# Patient Record
Sex: MALE | Race: WHITE | Employment: FULL TIME | URBAN - METROPOLITAN AREA
[De-identification: names, ages, dates, MRNs, and addresses within clinical notes are randomized per-mention and may not be internally consistent; named-entity substitution may affect disease eponyms.]

---

## 2017-01-12 ENCOUNTER — TELEPHONE (OUTPATIENT)
Dept: FAMILY MEDICINE CLINIC | Facility: CLINIC | Age: 57
End: 2017-01-12

## 2017-01-12 RX ORDER — AMLODIPINE BESYLATE 2.5 MG/1
TABLET ORAL
Qty: 30 TABLET | Refills: 2 | Status: SHIPPED | OUTPATIENT
Start: 2017-01-12 | End: 2017-04-16

## 2017-01-12 NOTE — TELEPHONE ENCOUNTER
AMLODIPINE BESYLATE 2.5MG TAB TAKE 1 TABLET 2.5MG BY MOUTH DAILY  PT [address] IN [facility]

## 2017-01-12 NOTE — TELEPHONE ENCOUNTER
Requesting Amlodipine refill    Hypertensive Medications  Protocol Criteria:  · Appointment scheduled in the past 6 months or in the next 3 months  · BMP or CMP in the past 12 months  · Creatinine result < 2  Recent Visits       Provider Department Primary

## 2017-02-15 ENCOUNTER — OFFICE VISIT (OUTPATIENT)
Dept: ALLERGY | Facility: CLINIC | Age: 57
End: 2017-02-15

## 2017-02-15 VITALS
RESPIRATION RATE: 16 BRPM | SYSTOLIC BLOOD PRESSURE: 150 MMHG | HEART RATE: 70 BPM | OXYGEN SATURATION: 95 % | WEIGHT: 225 LBS | HEIGHT: 71 IN | BODY MASS INDEX: 31.5 KG/M2 | TEMPERATURE: 99 F | DIASTOLIC BLOOD PRESSURE: 90 MMHG

## 2017-02-15 DIAGNOSIS — Z91.09 ENVIRONMENTAL ALLERGIES: ICD-10-CM

## 2017-02-15 DIAGNOSIS — L30.9 DERMATITIS: Primary | ICD-10-CM

## 2017-02-15 PROCEDURE — 99214 OFFICE O/P EST MOD 30 MIN: CPT | Performed by: ALLERGY & IMMUNOLOGY

## 2017-02-15 PROCEDURE — 99212 OFFICE O/P EST SF 10 MIN: CPT | Performed by: ALLERGY & IMMUNOLOGY

## 2017-02-15 RX ORDER — AMMONIUM LACTATE 12 G/100G
CREAM TOPICAL
Qty: 385 G | Refills: 1 | Status: SHIPPED | OUTPATIENT
Start: 2017-02-15

## 2017-02-15 NOTE — PATIENT INSTRUCTIONS
Benadryl 25-50 mg once a night at bedtime as needed for itching or if weakening during the middle of night with itching  Consider Allegra, fexofenadine 180 mg once a day during the day or night as a 24 hour antihistamine and is less likely to make you slee

## 2017-02-15 NOTE — PROGRESS NOTES
Lashay Lewis is a 60year old male. HPI:   Patient presents with: Allergies: F/u from 12/14/16 for Dermatitis, Urticaria, and Environmental Allergies. Patient reports minimal improvement.   He is not currently taking the Xyzal daily due to sedation, HISTORY:  Past Medical History   Diagnosis Date   • Lipid screening 02-     per [employer]   • Essential hypertension       History reviewed. No pertinent past surgical history.    Family History   Problem Relation Age of Onset Neck/Thyroid: neck is supple without adenopathy  Lymphatic: no abnormal cervical, supraclavicular or axillary adenopathy is noted  Respiratory: normal to inspection lungs are clear to auscultation bilaterally normal respiratory effort   Cardiovascular: r self administration of these medications. Teaching, instruction and sample was provided to the patient by myself. Teaching included  a review of potential adverse side effects as well as potential efficacy.   Patient's questions were answered in regards t

## 2017-02-20 ENCOUNTER — NURSE ONLY (OUTPATIENT)
Dept: ALLERGY | Facility: CLINIC | Age: 57
End: 2017-02-20

## 2017-02-20 DIAGNOSIS — L30.9 DERMATITIS: Primary | ICD-10-CM

## 2017-02-20 PROCEDURE — 95044 PATCH/APPLICATION TESTS: CPT | Performed by: ALLERGY & IMMUNOLOGY

## 2017-02-22 ENCOUNTER — NURSE ONLY (OUTPATIENT)
Dept: ALLERGY | Facility: CLINIC | Age: 57
End: 2017-02-22

## 2017-02-22 VITALS
HEART RATE: 89 BPM | RESPIRATION RATE: 19 BRPM | TEMPERATURE: 97 F | DIASTOLIC BLOOD PRESSURE: 86 MMHG | SYSTOLIC BLOOD PRESSURE: 132 MMHG

## 2017-02-23 ENCOUNTER — NURSE ONLY (OUTPATIENT)
Dept: ALLERGY | Facility: CLINIC | Age: 57
End: 2017-02-23

## 2017-02-23 ENCOUNTER — OFFICE VISIT (OUTPATIENT)
Dept: ALLERGY | Facility: CLINIC | Age: 57
End: 2017-02-23

## 2017-02-23 VITALS
HEIGHT: 71 IN | WEIGHT: 238 LBS | TEMPERATURE: 98 F | RESPIRATION RATE: 16 BRPM | HEART RATE: 60 BPM | SYSTOLIC BLOOD PRESSURE: 138 MMHG | DIASTOLIC BLOOD PRESSURE: 89 MMHG | BODY MASS INDEX: 33.32 KG/M2

## 2017-02-23 DIAGNOSIS — L30.9 DERMATITIS: Primary | ICD-10-CM

## 2017-02-23 DIAGNOSIS — Z91.09 ENVIRONMENTAL ALLERGIES: ICD-10-CM

## 2017-02-23 PROCEDURE — 95004 PERQ TESTS W/ALRGNC XTRCS: CPT | Performed by: ALLERGY & IMMUNOLOGY

## 2017-02-23 PROCEDURE — 99212 OFFICE O/P EST SF 10 MIN: CPT | Performed by: ALLERGY & IMMUNOLOGY

## 2017-02-23 PROCEDURE — 99214 OFFICE O/P EST MOD 30 MIN: CPT | Performed by: ALLERGY & IMMUNOLOGY

## 2017-02-23 NOTE — PROGRESS NOTES
Red Gonzalez is a 60year old male. HPI:   Patient presents with: Allergies: Pt presents for final patch test reading    Patient is a 53-year-old male who presents for follow-up with a chief complaint of allergies and dermatitis.   Patient presents patient reports rash and itching is overall improved in the interim with recent trial of Lac-Hydrin as a moisturizer and Lidex 0.05% cream as a topical steroid  Patient happy with improvement to date         HISTORY:  Past Medical History   Diagnosis Date dyspnea and wheezing    PHYSICAL EXAM:   Constitutional: responsive, no acute distress noted  Head/Face: NC/Atraumatic  Eyes/Vision: conjunctiva and lids are normal extraocular motion is intact   Ears/Audiometry: tympanic membranes are normal bilaterally h in 6 months or sooner if needed  Patient to call for refills in the interim if needed       Orders This Visit:  No orders of the defined types were placed in this encounter.        Meds This Visit:    No prescriptions requested or ordered in this encounter

## 2017-03-27 NOTE — TELEPHONE ENCOUNTER
Received refill request for:    Medication Detail      Medication Quantity Refills Last Filled      Fluocinonide 0.05 % External Cream 120 g 1 2/15/2017      Sig :  Apply twice a day to involved areas       Route:   (none)       LOV: 2/23/17      Advised 6

## 2017-04-16 RX ORDER — AMLODIPINE BESYLATE 2.5 MG/1
TABLET ORAL
Qty: 30 TABLET | Refills: 0 | Status: SHIPPED | OUTPATIENT
Start: 2017-04-16 | End: 2017-05-10

## 2017-05-10 RX ORDER — AMLODIPINE BESYLATE 2.5 MG/1
TABLET ORAL
Qty: 30 TABLET | Refills: 0 | Status: SHIPPED | OUTPATIENT
Start: 2017-05-10 | End: 2017-06-13

## 2017-06-13 RX ORDER — AMLODIPINE BESYLATE 2.5 MG/1
TABLET ORAL
Qty: 30 TABLET | Refills: 0 | Status: SHIPPED | OUTPATIENT
Start: 2017-06-13 | End: 2017-07-10

## 2017-07-10 ENCOUNTER — OFFICE VISIT (OUTPATIENT)
Dept: FAMILY MEDICINE CLINIC | Facility: CLINIC | Age: 57
End: 2017-07-10

## 2017-07-10 VITALS
WEIGHT: 238 LBS | SYSTOLIC BLOOD PRESSURE: 130 MMHG | HEART RATE: 66 BPM | HEIGHT: 71 IN | TEMPERATURE: 98 F | RESPIRATION RATE: 16 BRPM | BODY MASS INDEX: 33.32 KG/M2 | DIASTOLIC BLOOD PRESSURE: 80 MMHG

## 2017-07-10 DIAGNOSIS — I10 ESSENTIAL HYPERTENSION: Primary | ICD-10-CM

## 2017-07-10 DIAGNOSIS — Z12.11 COLON CANCER SCREENING: ICD-10-CM

## 2017-07-10 PROCEDURE — 99214 OFFICE O/P EST MOD 30 MIN: CPT | Performed by: FAMILY MEDICINE

## 2017-07-10 PROCEDURE — 99212 OFFICE O/P EST SF 10 MIN: CPT | Performed by: FAMILY MEDICINE

## 2017-07-10 RX ORDER — AMLODIPINE BESYLATE 2.5 MG/1
TABLET ORAL
Qty: 30 TABLET | Refills: 2 | Status: SHIPPED | OUTPATIENT
Start: 2017-07-10 | End: 2017-10-08

## 2017-07-10 NOTE — PATIENT INSTRUCTIONS
Monitor blood pressures and record at home. Limit salt intake. Recommend weight loss via daily exercising and consistent healthy dietary changes. Medication reviewed and renewed where needed and appropriate. To GI for colonoscopy.

## 2017-07-10 NOTE — PROGRESS NOTES
HPI:    Patient ID: Kimi Reeves is a 59year old male. 59year old CM for colonoscopy referral. Asymptomatic. Last one 10 years ago. No polyps. A few diverticulum. Hypertension   This is a chronic problem.  The current episode started more than Essential hypertension  To goal.   - AmLODIPine Besylate 2.5 MG Oral Tab; TAKE 1 TABLET(2.5 MG) BY MOUTH DAILY  Dispense: 30 tablet; Refill: 2    2.  Colon cancer screening  Referred  - [facility] - INTERNAL    No orders of the defined types were placed in this 7977

## 2017-09-20 NOTE — TELEPHONE ENCOUNTER
Received refill request for:    Medication Quantity Refill Last Filled     FLUOCINONIDE 0.05 % External Cream 120 g 2 3/27/2017    Sig :  APPLY TO THE AFFECTED AREA TWICE DAILY       LOV: 2/23/2017      Advised f/u: 6 months or sooner if needed      [place]

## 2017-10-08 DIAGNOSIS — I10 ESSENTIAL HYPERTENSION: ICD-10-CM

## 2017-10-09 RX ORDER — AMLODIPINE BESYLATE 2.5 MG/1
TABLET ORAL
Qty: 30 TABLET | Refills: 2 | Status: SHIPPED | OUTPATIENT
Start: 2017-10-09 | End: 2018-01-05

## 2017-10-09 NOTE — TELEPHONE ENCOUNTER
Hypertensive Medications  Protocol Criteria:  · Appointment scheduled in the past 6 months or in the next 3 months  · BMP or CMP in the past 12 months  · Creatinine result < 2  Recent Outpatient Visits            3 months ago Essential hypertension    [facility]

## 2017-11-07 ENCOUNTER — IMMUNIZATION (OUTPATIENT)
Dept: FAMILY MEDICINE CLINIC | Facility: CLINIC | Age: 57
End: 2017-11-07

## 2017-11-07 DIAGNOSIS — Z23 NEED FOR VACCINATION: ICD-10-CM

## 2017-11-07 PROCEDURE — 90686 IIV4 VACC NO PRSV 0.5 ML IM: CPT | Performed by: FAMILY MEDICINE

## 2017-11-07 PROCEDURE — 90471 IMMUNIZATION ADMIN: CPT | Performed by: FAMILY MEDICINE

## 2017-12-13 ENCOUNTER — OFFICE VISIT (OUTPATIENT)
Dept: GASTROENTEROLOGY | Facility: CLINIC | Age: 57
End: 2017-12-13

## 2017-12-13 ENCOUNTER — TELEPHONE (OUTPATIENT)
Dept: GASTROENTEROLOGY | Facility: CLINIC | Age: 57
End: 2017-12-13

## 2017-12-13 VITALS
BODY MASS INDEX: 33.99 KG/M2 | WEIGHT: 242.81 LBS | HEIGHT: 71 IN | HEART RATE: 75 BPM | DIASTOLIC BLOOD PRESSURE: 80 MMHG | SYSTOLIC BLOOD PRESSURE: 130 MMHG

## 2017-12-13 DIAGNOSIS — Z12.11 COLON CANCER SCREENING: Primary | ICD-10-CM

## 2017-12-13 PROCEDURE — 99212 OFFICE O/P EST SF 10 MIN: CPT | Performed by: INTERNAL MEDICINE

## 2017-12-13 PROCEDURE — 99203 OFFICE O/P NEW LOW 30 MIN: CPT | Performed by: INTERNAL MEDICINE

## 2017-12-13 NOTE — TELEPHONE ENCOUNTER
Scheduled for:  Colonoscopy [address]  Provider Name:   Date:  2/26/18  Location:  [facility]  Sedation:  Mac  Time:  0900 / Arrival 0800  Prep:Split Dose Suprep  Meds/Allergies Reconciled?:  Physician reviewed  Diagnosis with codes:  Colon Cancer Screening Z

## 2017-12-13 NOTE — PROGRESS NOTES
Cricket Ferrer is a 59year old male. HPI:   Patient presents with:  Colonoscopy Screening: Last colon was about 10 years ago and [address].  No current complaints      The patient is a 61-year-old male with history of high blood pressure who presents f External Cream Apply twice a day to involved areas Disp: 385 g Rfl: 1   triamcinolone acetonide 0.1 % External Cream Apply topically 2 (two) times daily as needed.  Disp: 453.6 g Rfl: 0   Levocetirizine Dihydrochloride (XYZAL) 5 MG Oral Tab Take 1 tablet (5

## 2018-01-05 DIAGNOSIS — I10 ESSENTIAL HYPERTENSION: ICD-10-CM

## 2018-01-08 RX ORDER — AMLODIPINE BESYLATE 2.5 MG/1
TABLET ORAL
Qty: 90 TABLET | Refills: 0 | Status: SHIPPED | OUTPATIENT
Start: 2018-01-08 | End: 2018-04-06

## 2018-01-08 NOTE — TELEPHONE ENCOUNTER
Pt came into the office to check on his refill. He is going out of town tomorrow & would like to know if it can be refilled by tomorrow.

## 2018-01-08 NOTE — TELEPHONE ENCOUNTER
Hypertensive Medications  Protocol Criteria:  · Appointment scheduled in the past 6 months or in the next 3 months  · BMP or CMP in the past 12 months  · Creatinine result < 2  Recent Outpatient Visits            3 weeks ago [facility]

## 2018-02-26 ENCOUNTER — TELEPHONE (OUTPATIENT)
Dept: FAMILY MEDICINE CLINIC | Facility: CLINIC | Age: 58
End: 2018-02-26

## 2018-02-26 ENCOUNTER — ANESTHESIA (OUTPATIENT)
Dept: ENDOSCOPY | Facility: HOSPITAL | Age: 58
End: 2018-02-26
Payer: COMMERCIAL

## 2018-02-26 ENCOUNTER — SURGERY (OUTPATIENT)
Age: 58
End: 2018-02-26

## 2018-02-26 ENCOUNTER — HOSPITAL ENCOUNTER (OUTPATIENT)
Facility: HOSPITAL | Age: 58
Setting detail: HOSPITAL OUTPATIENT SURGERY
Discharge: HOME | End: 2018-02-26
Attending: INTERNAL MEDICINE | Admitting: INTERNAL MEDICINE
Payer: COMMERCIAL

## 2018-02-26 ENCOUNTER — ANESTHESIA EVENT (OUTPATIENT)
Dept: ENDOSCOPY | Facility: HOSPITAL | Age: 58
End: 2018-02-26
Payer: COMMERCIAL

## 2018-02-26 VITALS
BODY MASS INDEX: 31.85 KG/M2 | RESPIRATION RATE: 20 BRPM | DIASTOLIC BLOOD PRESSURE: 92 MMHG | OXYGEN SATURATION: 99 % | SYSTOLIC BLOOD PRESSURE: 133 MMHG | HEART RATE: 59 BPM | WEIGHT: 225 LBS | HEIGHT: 70.5 IN

## 2018-02-26 DIAGNOSIS — Z12.11 COLON CANCER SCREENING: ICD-10-CM

## 2018-02-26 PROCEDURE — 0DBM8ZX EXCISION OF DESCENDING COLON, VIA NATURAL OR ARTIFICIAL OPENING ENDOSCOPIC, DIAGNOSTIC: ICD-10-PCS | Performed by: INTERNAL MEDICINE

## 2018-02-26 PROCEDURE — 0DBN8ZX EXCISION OF SIGMOID COLON, VIA NATURAL OR ARTIFICIAL OPENING ENDOSCOPIC, DIAGNOSTIC: ICD-10-PCS | Performed by: INTERNAL MEDICINE

## 2018-02-26 PROCEDURE — 0DBL8ZX EXCISION OF TRANSVERSE COLON, VIA NATURAL OR ARTIFICIAL OPENING ENDOSCOPIC, DIAGNOSTIC: ICD-10-PCS | Performed by: INTERNAL MEDICINE

## 2018-02-26 PROCEDURE — 45385 COLONOSCOPY W/LESION REMOVAL: CPT | Performed by: INTERNAL MEDICINE

## 2018-02-26 PROCEDURE — 45380 COLONOSCOPY AND BIOPSY: CPT | Performed by: INTERNAL MEDICINE

## 2018-02-26 RX ORDER — LIDOCAINE HYDROCHLORIDE 10 MG/ML
INJECTION, SOLUTION EPIDURAL; INFILTRATION; INTRACAUDAL; PERINEURAL AS NEEDED
Status: DISCONTINUED | OUTPATIENT
Start: 2018-02-26 | End: 2018-02-26 | Stop reason: SURG

## 2018-02-26 RX ORDER — SODIUM CHLORIDE, SODIUM LACTATE, POTASSIUM CHLORIDE, CALCIUM CHLORIDE 600; 310; 30; 20 MG/100ML; MG/100ML; MG/100ML; MG/100ML
INJECTION, SOLUTION INTRAVENOUS CONTINUOUS
Status: DISCONTINUED | OUTPATIENT
Start: 2018-02-26 | End: 2018-02-26

## 2018-02-26 RX ORDER — ONDANSETRON 2 MG/ML
4 INJECTION INTRAMUSCULAR; INTRAVENOUS ONCE AS NEEDED
Status: DISCONTINUED | OUTPATIENT
Start: 2018-02-26 | End: 2018-02-26

## 2018-02-26 RX ORDER — NALOXONE HYDROCHLORIDE 0.4 MG/ML
80 INJECTION, SOLUTION INTRAMUSCULAR; INTRAVENOUS; SUBCUTANEOUS AS NEEDED
Status: DISCONTINUED | OUTPATIENT
Start: 2018-02-26 | End: 2018-02-26

## 2018-02-26 RX ORDER — MORPHINE SULFATE 4 MG/ML
4 INJECTION, SOLUTION INTRAMUSCULAR; INTRAVENOUS EVERY 10 MIN PRN
Status: DISCONTINUED | OUTPATIENT
Start: 2018-02-26 | End: 2018-02-26

## 2018-02-26 RX ORDER — MORPHINE SULFATE 4 MG/ML
2 INJECTION, SOLUTION INTRAMUSCULAR; INTRAVENOUS EVERY 10 MIN PRN
Status: DISCONTINUED | OUTPATIENT
Start: 2018-02-26 | End: 2018-02-26

## 2018-02-26 RX ORDER — HYDROCODONE BITARTRATE AND ACETAMINOPHEN 5; 325 MG/1; MG/1
1 TABLET ORAL AS NEEDED
Status: DISCONTINUED | OUTPATIENT
Start: 2018-02-26 | End: 2018-02-26

## 2018-02-26 RX ORDER — HYDROCODONE BITARTRATE AND ACETAMINOPHEN 5; 325 MG/1; MG/1
2 TABLET ORAL AS NEEDED
Status: DISCONTINUED | OUTPATIENT
Start: 2018-02-26 | End: 2018-02-26

## 2018-02-26 RX ORDER — MORPHINE SULFATE 10 MG/ML
6 INJECTION, SOLUTION INTRAMUSCULAR; INTRAVENOUS EVERY 10 MIN PRN
Status: DISCONTINUED | OUTPATIENT
Start: 2018-02-26 | End: 2018-02-26

## 2018-02-26 RX ORDER — HALOPERIDOL 5 MG/ML
0.25 INJECTION INTRAMUSCULAR ONCE AS NEEDED
Status: DISCONTINUED | OUTPATIENT
Start: 2018-02-26 | End: 2018-02-26

## 2018-02-26 RX ADMIN — SODIUM CHLORIDE, SODIUM LACTATE, POTASSIUM CHLORIDE, CALCIUM CHLORIDE: 600; 310; 30; 20 INJECTION, SOLUTION INTRAVENOUS at 09:40:00

## 2018-02-26 RX ADMIN — SODIUM CHLORIDE, SODIUM LACTATE, POTASSIUM CHLORIDE, CALCIUM CHLORIDE: 600; 310; 30; 20 INJECTION, SOLUTION INTRAVENOUS at 09:08:00

## 2018-02-26 RX ADMIN — LIDOCAINE HYDROCHLORIDE 50 MG: 10 INJECTION, SOLUTION EPIDURAL; INFILTRATION; INTRACAUDAL; PERINEURAL at 09:10:00

## 2018-02-26 NOTE — TELEPHONE ENCOUNTER
I contacted pt and informed that it was entered as Z12.11 colon cancer screening (preventative, not diagnostic). Pt does state that he was told polyps were found.   I informed pt to let billing know if any problems, as the reason was still preventative, th

## 2018-02-26 NOTE — ANESTHESIA PREPROCEDURE EVALUATION
Anesthesia PreOp Note    HPI:     Gallito Pereira is a 59year old male who presents for preoperative consultation requested by: Kat Amos MD    Date of Surgery: 2/26/2018    Procedure(s):  COLONOSCOPY  Indication: Colon cancer screening       H Occupational History  None on file     Social History Main Topics   Smoking status: Former Smoker     Smokeless tobacco: Never Used    Comment: quit 40 years ago    Alcohol use Yes  0.0 oz/week     Comment: 1 drink daily    Drug use: No    Sexual activ

## 2018-02-26 NOTE — TELEPHONE ENCOUNTER
Pt wife called states pt appt is at 9 today   Needs to change from diagnostic to routine  Wife requesting call back

## 2018-02-26 NOTE — OPERATIVE REPORT
[facility] HOSP - [facility] Endoscopy Report      Preoperative Diagnosis:  - colon cancer screening      Postoperative Diagnosis:  - colon polyps x 5  - diverticulosis  - internal hemorrhoids      Procedure:    Colonoscopy       Surgeon:  Beatriz Davis M.D disease  - Symptomatic treatment of hemorrhoids          Jhony Patton MD  2/26/2018  9:42 AM

## 2018-02-26 NOTE — TELEPHONE ENCOUNTER
Not sure I understand this message. I always put in colon ca screening for colonoscopies.  If I put it in again it will be the same referral to see the GI specialist.

## 2018-02-26 NOTE — H&P
History & Physical Examination    Patient Name: Kendy Todd  MRN: [id number]  [facility]: [id number]  YOB: 1960    Diagnosis: colon cancer screening    Prescriptions Prior to Admission:  AMLODIPINE BESYLATE 2.5 MG Oral Tab TAKE 1 TABLET(2.5 M Naloxone HCl (NARCAN) 0.4 MG/ML injection 80 mcg 80 mcg Intravenous PRN       Allergies: No Known Allergies    Past Medical History:   Diagnosis Date   • Essential hypertension    • High blood pressure    • Lipid screening 02-    per Shane RUGGIERO

## 2018-02-26 NOTE — TELEPHONE ENCOUNTER
This is for a screening colonoscopy. I am not sure what the wife is requesting this from Dr. Jennifer Sanchez or Dr. Venus Villarreal? Dr. Venus Villarrela performed the procedure. Forwarding to Dr. Ksenia Vidal staff for review and advice.

## 2018-02-26 NOTE — ANESTHESIA POSTPROCEDURE EVALUATION
Patient: Patrica Cannon    Procedure Summary     Date:  02/26/18 Room / Location:  [address] [place] [address] ENDOSCOPY 01 / [address] [place] [address] ENDOSCOPY    Anesthesia Start:  0908 Anesthesia Stop:      Procedure:  COLONOSCOPY (N/A ) Diagnosis:       Colon cancer screening      (Polyps, di

## 2018-02-26 NOTE — TELEPHONE ENCOUNTER
Pt's wife is calling to ask if DR darek puentes change the procedure code for his colonoscopy. He is having a routine 10 yr colonoscopy at 9 AM [facility]. Per pt's wife the diagnostic DR lou is for a diagnostic colonoscopy. pls advise.  Thank you

## 2018-02-28 ENCOUNTER — TELEPHONE (OUTPATIENT)
Dept: GASTROENTEROLOGY | Facility: CLINIC | Age: 58
End: 2018-02-28

## 2018-02-28 NOTE — TELEPHONE ENCOUNTER
Recall colon in 3 years ,2/26/21 per  and Entered thru [facility] [place].  Colon done 2/26/18,Lab letter mailed to Pt

## 2018-03-20 NOTE — TELEPHONE ENCOUNTER
Spoke to wife Harriet Alexander. She will let him know he needs an appointment to follow up with Dr. Joo Vieira. He will call us to schedule.

## 2018-03-20 NOTE — TELEPHONE ENCOUNTER
Refill requested for FLUOCINONIDE 0.05% CREAM 60GM  Will file in chart as: FLUOCINONIDE 0.05 % External Cream  APPLY TO THE AFFECTED AREA TWICE DAILY       Disp: 120 g Refills: 0    Class: Normal Start: 3/20/2018   Originally ordered: 1 year ago by Mia Baig

## 2018-03-20 NOTE — TELEPHONE ENCOUNTER
Call reviewed and noted. Lidex refilled as requested. Patient last seen in February 2017.   Recommend follow-up appointment within the next 1-2 months for his yearly

## 2018-03-26 ENCOUNTER — OFFICE VISIT (OUTPATIENT)
Dept: FAMILY MEDICINE CLINIC | Facility: CLINIC | Age: 58
End: 2018-03-26

## 2018-03-26 ENCOUNTER — PRIOR ORIGINAL RECORDS (OUTPATIENT)
Dept: OTHER | Age: 58
End: 2018-03-26

## 2018-03-26 ENCOUNTER — LAB ENCOUNTER (OUTPATIENT)
Dept: LAB | Age: 58
End: 2018-03-26
Attending: FAMILY MEDICINE
Payer: COMMERCIAL

## 2018-03-26 VITALS
WEIGHT: 235 LBS | SYSTOLIC BLOOD PRESSURE: 110 MMHG | BODY MASS INDEX: 32.9 KG/M2 | TEMPERATURE: 98 F | DIASTOLIC BLOOD PRESSURE: 70 MMHG | RESPIRATION RATE: 16 BRPM | HEIGHT: 71 IN | HEART RATE: 69 BPM

## 2018-03-26 DIAGNOSIS — Z13.29 THYROID DISORDER SCREEN: ICD-10-CM

## 2018-03-26 DIAGNOSIS — Z13.220 LIPID SCREENING: ICD-10-CM

## 2018-03-26 DIAGNOSIS — I10 ESSENTIAL HYPERTENSION: ICD-10-CM

## 2018-03-26 DIAGNOSIS — Z12.5 PROSTATE CANCER SCREENING: ICD-10-CM

## 2018-03-26 DIAGNOSIS — I49.8 BIGEMINAL RHYTHM: ICD-10-CM

## 2018-03-26 LAB
ALBUMIN SERPL BCP-MCNC: 4.2 G/DL (ref 3.5–4.8)
ALBUMIN/GLOB SERPL: 1.6 {RATIO} (ref 1–2)
ALP SERPL-CCNC: 50 U/L (ref 32–100)
ALT SERPL-CCNC: 34 U/L (ref 17–63)
ANION GAP SERPL CALC-SCNC: 8 MMOL/L (ref 0–18)
AST SERPL-CCNC: 26 U/L (ref 15–41)
BASOPHILS # BLD: 0.1 K/UL (ref 0–0.2)
BASOPHILS NFR BLD: 1 %
BILIRUB SERPL-MCNC: 1 MG/DL (ref 0.3–1.2)
BILIRUB UR QL: NEGATIVE
BUN SERPL-MCNC: 13 MG/DL (ref 8–20)
BUN/CREAT SERPL: 16.7 (ref 10–20)
CALCIUM SERPL-MCNC: 9.1 MG/DL (ref 8.5–10.5)
CHLORIDE SERPL-SCNC: 103 MMOL/L (ref 95–110)
CHOLEST SERPL-MCNC: 159 MG/DL (ref 110–200)
CLARITY UR: CLEAR
CO2 SERPL-SCNC: 27 MMOL/L (ref 22–32)
COLOR UR: YELLOW
CREAT SERPL-MCNC: 0.78 MG/DL (ref 0.5–1.5)
EOSINOPHIL # BLD: 0.2 K/UL (ref 0–0.7)
EOSINOPHIL NFR BLD: 3 %
ERYTHROCYTE [DISTWIDTH] IN BLOOD BY AUTOMATED COUNT: 14.1 % (ref 11–15)
GLOBULIN PLAS-MCNC: 2.6 G/DL (ref 2.5–3.7)
GLUCOSE SERPL-MCNC: 97 MG/DL (ref 70–99)
GLUCOSE UR-MCNC: NEGATIVE MG/DL
HCT VFR BLD AUTO: 45.9 % (ref 41–52)
HDLC SERPL-MCNC: 43 MG/DL
HGB BLD-MCNC: 15.7 G/DL (ref 13.5–17.5)
HGB UR QL STRIP.AUTO: NEGATIVE
KETONES UR-MCNC: NEGATIVE MG/DL
LDLC SERPL CALC-MCNC: 102 MG/DL (ref 0–99)
LEUKOCYTE ESTERASE UR QL STRIP.AUTO: NEGATIVE
LYMPHOCYTES # BLD: 1.4 K/UL (ref 1–4)
LYMPHOCYTES NFR BLD: 29 %
MCH RBC QN AUTO: 31.4 PG (ref 27–32)
MCHC RBC AUTO-ENTMCNC: 34.3 G/DL (ref 32–37)
MCV RBC AUTO: 91.7 FL (ref 80–100)
MONOCYTES # BLD: 0.4 K/UL (ref 0–1)
MONOCYTES NFR BLD: 8 %
NEUTROPHILS # BLD AUTO: 2.9 K/UL (ref 1.8–7.7)
NEUTROPHILS NFR BLD: 59 %
NITRITE UR QL STRIP.AUTO: NEGATIVE
NONHDLC SERPL-MCNC: 116 MG/DL
OSMOLALITY UR CALC.SUM OF ELEC: 286 MOSM/KG (ref 275–295)
PATIENT FASTING: YES
PH UR: 6 [PH] (ref 5–8)
PLATELET # BLD AUTO: 179 K/UL (ref 140–400)
PMV BLD AUTO: 8.1 FL (ref 7.4–10.3)
POTASSIUM SERPL-SCNC: 4.1 MMOL/L (ref 3.3–5.1)
PROT SERPL-MCNC: 6.8 G/DL (ref 5.9–8.4)
PROT UR-MCNC: NEGATIVE MG/DL
PSA SERPL-MCNC: 1.3 NG/ML (ref 0–4)
RBC # BLD AUTO: 5 M/UL (ref 4.5–5.9)
SODIUM SERPL-SCNC: 138 MMOL/L (ref 136–144)
SP GR UR STRIP: 1.01 (ref 1–1.03)
TRIGL SERPL-MCNC: 71 MG/DL (ref 1–149)
TSH SERPL-ACNC: 1.38 UIU/ML (ref 0.45–5.33)
UROBILINOGEN UR STRIP-ACNC: <2
VIT C UR-MCNC: NEGATIVE MG/DL
WBC # BLD AUTO: 4.9 K/UL (ref 4–11)

## 2018-03-26 PROCEDURE — 80053 COMPREHEN METABOLIC PANEL: CPT

## 2018-03-26 PROCEDURE — 36415 COLL VENOUS BLD VENIPUNCTURE: CPT

## 2018-03-26 PROCEDURE — 99213 OFFICE O/P EST LOW 20 MIN: CPT | Performed by: FAMILY MEDICINE

## 2018-03-26 PROCEDURE — 85025 COMPLETE CBC W/AUTO DIFF WBC: CPT

## 2018-03-26 PROCEDURE — 80061 LIPID PANEL: CPT

## 2018-03-26 PROCEDURE — 80050 GENERAL HEALTH PANEL: CPT

## 2018-03-26 PROCEDURE — 93005 ELECTROCARDIOGRAM TRACING: CPT | Performed by: FAMILY MEDICINE

## 2018-03-26 PROCEDURE — 99396 PREV VISIT EST AGE 40-64: CPT | Performed by: FAMILY MEDICINE

## 2018-03-26 PROCEDURE — 81003 URINALYSIS AUTO W/O SCOPE: CPT

## 2018-03-26 PROCEDURE — 93000 ELECTROCARDIOGRAM COMPLETE: CPT | Performed by: FAMILY MEDICINE

## 2018-03-26 NOTE — PROGRESS NOTES
HPI:    Patient ID: Evan العراقي is a 59year old male.     59year old  male here for complete preventive care physical and for status update on any confirmed chronic medical illnesses and follow up on any previous labs or procedures that we is oriented to person, place, and time. He appears well-developed and well-nourished. No distress.    HENT:   Right Ear: Tympanic membrane and ear canal normal.   Left Ear: Tympanic membrane and ear canal normal.   Nose: Nose normal.   Mouth/Throat: Orophar

## 2018-03-29 ENCOUNTER — HOSPITAL ENCOUNTER (OUTPATIENT)
Dept: CV DIAGNOSTICS | Facility: HOSPITAL | Age: 58
Discharge: HOME | End: 2018-03-29
Attending: FAMILY MEDICINE
Payer: COMMERCIAL

## 2018-03-29 DIAGNOSIS — I49.8 BIGEMINAL RHYTHM: ICD-10-CM

## 2018-03-29 PROCEDURE — 93227 XTRNL ECG REC<48 HR R&I: CPT | Performed by: FAMILY MEDICINE

## 2018-03-31 ENCOUNTER — HOSPITAL ENCOUNTER (OUTPATIENT)
Dept: CV DIAGNOSTICS | Facility: HOSPITAL | Age: 58
Discharge: HOME | End: 2018-03-31
Attending: FAMILY MEDICINE
Payer: COMMERCIAL

## 2018-03-31 PROCEDURE — 93225 XTRNL ECG REC<48 HRS REC: CPT | Performed by: FAMILY MEDICINE

## 2018-04-02 RX ORDER — MIDAZOLAM HYDROCHLORIDE 1 MG/ML
INJECTION INTRAMUSCULAR; INTRAVENOUS
Status: DISCONTINUED
Start: 2018-04-02 | End: 2018-04-02 | Stop reason: WASHOUT

## 2018-04-03 ENCOUNTER — TELEPHONE (OUTPATIENT)
Dept: FAMILY MEDICINE CLINIC | Facility: CLINIC | Age: 58
End: 2018-04-03

## 2018-04-03 NOTE — TELEPHONE ENCOUNTER
Spoke to Efren in [facility]. Efren stated results were received. Per Efren, results of holter monitor test need to be QC'd. Efren states results will be QC'd and sent to cardiologist for review.      Per Efren, results should be available on A

## 2018-04-03 NOTE — TELEPHONE ENCOUNTER
Spoke to Efren at [facility] and they state results are still at cardiomedics waiting to be analyzed. Results to be read today per cardiographics.  Once results are read, they will be mailed to a cardiologist at [facility].    Attempted to co

## 2018-04-04 NOTE — TELEPHONE ENCOUNTER
Patient arrived in office today asking for report from holter monitor testing. Pacheco Francis in [facility] and he stated, cardiologist has report and it needs to be reviewed.      Informed patient, that report is with cardiologist and it alli

## 2018-04-05 ENCOUNTER — PRIOR ORIGINAL RECORDS (OUTPATIENT)
Dept: OTHER | Age: 58
End: 2018-04-05

## 2018-04-05 ENCOUNTER — TELEPHONE (OUTPATIENT)
Dept: FAMILY MEDICINE CLINIC | Facility: CLINIC | Age: 58
End: 2018-04-05

## 2018-04-06 DIAGNOSIS — I10 ESSENTIAL HYPERTENSION: ICD-10-CM

## 2018-04-06 RX ORDER — AMLODIPINE BESYLATE 2.5 MG/1
TABLET ORAL
Qty: 90 TABLET | Refills: 0 | Status: SHIPPED | OUTPATIENT
Start: 2018-04-06 | End: 2018-07-09

## 2018-04-06 NOTE — TELEPHONE ENCOUNTER
Dr Lakeshia Yanes, just FYI: pt already saw a cardiologist yesterday,  Dr Itzel Reich at [facility], who is a family friend. Pt [facility] cardiologist ordered a heart scan, and will forwoard results to you.

## 2018-04-06 NOTE — TELEPHONE ENCOUNTER
Rx approved for 90 days per protocol.     Hypertensive Medications  Protocol Criteria:  · Appointment scheduled in the past 6 months or in the next 3 months  · BMP or CMP in the past 12 months  · Creatinine result < 2  Recent Outpatient Visits            1

## 2018-04-12 LAB
ALBUMIN: 4.2 G/DL
ALKALINE PHOSPHATATE(ALK PHOS): 50 IU/L
BILIRUBIN TOTAL: 1 MG/DL
BUN: 13 MG/DL
CALCIUM: 9.1 MG/DL
CHLORIDE: 103 MEQ/L
CHOLESTEROL, TOTAL: 159 MG/DL
CREATININE, SERUM: 0.78 MG/DL
GLUCOSE: 97 MG/DL
HDL CHOLESTEROL: 43 MG/DL
HEMATOCRIT: 45.9 %
HEMOGLOBIN: 15.7 G/DL
LDL CHOLESTEROL: 102 MG/DL
PLATELETS: 179 K/UL
POTASSIUM, SERUM: 4.1 MEQ/L
PROTEIN, TOTAL: 6.8 G/DL
RED BLOOD COUNT: 5 X 10-6/U
SGOT (AST): 26 IU/L
SGPT (ALT): 34 IU/L
SODIUM: 138 MEQ/L
THYROID STIMULATING HORMONE: 1.38 MLU/L
TRIGLYCERIDES: 71 MG/DL
WHITE BLOOD COUNT: 4.9 X 10-3/U

## 2018-06-25 ENCOUNTER — HOSPITAL ENCOUNTER (OUTPATIENT)
Dept: CT IMAGING | Age: 58
Discharge: HOME | End: 2018-06-25
Attending: FAMILY MEDICINE

## 2018-06-25 DIAGNOSIS — Z13.9 ENCOUNTER FOR SCREENING: ICD-10-CM

## 2018-06-25 NOTE — PROGRESS NOTES
Pt seen at [facility], [facility]:  PRELIMINARY SCORE= 290.42  BP= 110/70  Cholestec labs as follows: Labs from 3/26/18  TC= 159  HDL= 43  LDL= 102  TG= 71  GLUCOSE= 97  All results and risk factors discussed with patient; all questions and concerns addre

## 2018-07-03 ENCOUNTER — TELEPHONE (OUTPATIENT)
Dept: FAMILY MEDICINE CLINIC | Facility: CLINIC | Age: 58
End: 2018-07-03

## 2018-07-03 NOTE — TELEPHONE ENCOUNTER
----- Message from Olivia Mondragon DO sent at 7/3/2018 10:33 AM CDT -----  Patient needs cardiology following up based on cardiac risk assessment. See if patient is agreeable.  If he prefers to see me first then he can be placed on my schedule (same day

## 2018-07-05 NOTE — TELEPHONE ENCOUNTER
Patient stated he seen a cardiologist Dr. Curtis Alvarado at [facility].   Dr. Asmita Leong office number is [phone], [address], [place]    Patient stated Dr. Curtis Alvarado ordered a heart scan tulio

## 2018-07-06 NOTE — TELEPHONE ENCOUNTER
If he is following up with the cardiologist concerning his scan then he does not need to see me concerning this.

## 2018-07-09 DIAGNOSIS — I10 ESSENTIAL HYPERTENSION: ICD-10-CM

## 2018-07-09 RX ORDER — AMLODIPINE BESYLATE 2.5 MG/1
TABLET ORAL
Qty: 90 TABLET | Refills: 0 | Status: SHIPPED | OUTPATIENT
Start: 2018-07-09 | End: 2018-10-01

## 2018-07-10 NOTE — TELEPHONE ENCOUNTER
Hypertensive Medications  Protocol Criteria:  · Appointment scheduled in the past 6 months or in the next 3 months  · BMP or CMP in the past 12 months  · Creatinine result < 2  Recent Outpatient Visits            3 months ago Encounter for routine child he

## 2018-07-20 ENCOUNTER — PRIOR ORIGINAL RECORDS (OUTPATIENT)
Dept: OTHER | Age: 58
End: 2018-07-20

## 2018-07-26 ENCOUNTER — PRIOR ORIGINAL RECORDS (OUTPATIENT)
Dept: OTHER | Age: 58
End: 2018-07-26

## 2018-07-30 ENCOUNTER — HOSPITAL ENCOUNTER (OUTPATIENT)
Dept: CV DIAGNOSTICS | Facility: HOSPITAL | Age: 58
Discharge: HOME | End: 2018-07-30
Attending: INTERNAL MEDICINE
Payer: COMMERCIAL

## 2018-07-30 DIAGNOSIS — R93.1 ABNORMAL CT SCAN OF HEART: ICD-10-CM

## 2018-07-30 DIAGNOSIS — R93.1 ABNORMAL FINDINGS DIAGNOSTIC IMAGING OF HEART AND CORONARY CIRCULATION: ICD-10-CM

## 2018-07-30 DIAGNOSIS — R00.2 PALPITATIONS: ICD-10-CM

## 2018-07-30 DIAGNOSIS — Z82.49 FAMILY HISTORY OF CORONARY ARTERY DISEASE: ICD-10-CM

## 2018-07-30 PROCEDURE — 93016 CV STRESS TEST SUPVJ ONLY: CPT | Performed by: INTERNAL MEDICINE

## 2018-07-30 PROCEDURE — 93018 CV STRESS TEST I&R ONLY: CPT | Performed by: INTERNAL MEDICINE

## 2018-07-30 PROCEDURE — 93350 STRESS TTE ONLY: CPT | Performed by: INTERNAL MEDICINE

## 2018-07-30 PROCEDURE — 93017 CV STRESS TEST TRACING ONLY: CPT | Performed by: INTERNAL MEDICINE

## 2018-08-22 ENCOUNTER — PRIOR ORIGINAL RECORDS (OUTPATIENT)
Dept: OTHER | Age: 58
End: 2018-08-22

## 2018-08-22 ENCOUNTER — LAB ENCOUNTER (OUTPATIENT)
Dept: LAB | Age: 58
End: 2018-08-22
Attending: INTERNAL MEDICINE
Payer: COMMERCIAL

## 2018-08-22 DIAGNOSIS — E78.00 PURE HYPERCHOLESTEROLEMIA: Primary | ICD-10-CM

## 2018-08-22 DIAGNOSIS — I10 ESSENTIAL HYPERTENSION, MALIGNANT: ICD-10-CM

## 2018-08-22 LAB
ALT SERPL-CCNC: 26 U/L (ref 17–63)
AST SERPL-CCNC: 23 U/L (ref 15–41)
CHOLEST SERPL-MCNC: 94 MG/DL (ref 110–200)
HDLC SERPL-MCNC: 46 MG/DL
LDLC SERPL CALC-MCNC: 41 MG/DL (ref 0–99)
NONHDLC SERPL-MCNC: 48 MG/DL
TRIGL SERPL-MCNC: 35 MG/DL (ref 1–149)

## 2018-08-22 PROCEDURE — 84460 ALANINE AMINO (ALT) (SGPT): CPT

## 2018-08-22 PROCEDURE — 80061 LIPID PANEL: CPT

## 2018-08-22 PROCEDURE — 84450 TRANSFERASE (AST) (SGOT): CPT

## 2018-08-22 PROCEDURE — 36415 COLL VENOUS BLD VENIPUNCTURE: CPT

## 2018-08-27 ENCOUNTER — PRIOR ORIGINAL RECORDS (OUTPATIENT)
Dept: OTHER | Age: 58
End: 2018-08-27

## 2018-09-05 LAB
ALT (SGPT): 26 U/L
AST (SGOT): 23 U/L
CHOLESTEROL, TOTAL: 94 MG/DL
HDL CHOLESTEROL: 46 MG/DL
LDL CHOLESTEROL: 41 MG/DL
TRIGLYCERIDES: 35 MG/DL

## 2018-09-06 ENCOUNTER — PRIOR ORIGINAL RECORDS (OUTPATIENT)
Dept: OTHER | Age: 58
End: 2018-09-06

## 2018-10-01 DIAGNOSIS — I10 ESSENTIAL HYPERTENSION: ICD-10-CM

## 2018-10-01 RX ORDER — AMLODIPINE BESYLATE 2.5 MG/1
TABLET ORAL
Qty: 90 TABLET | Refills: 0 | Status: SHIPPED | OUTPATIENT
Start: 2018-10-01 | End: 2019-01-02

## 2018-10-01 NOTE — TELEPHONE ENCOUNTER
Please advise on refill request.   Protocol failed due to no recent visit.  LOV 03/26/18    Hypertensive Medications  Protocol Criteria:  · Appointment scheduled in the past 6 months or in the next 3 months  · BMP or CMP in the past 12 months  · Creatinine

## 2018-10-16 ENCOUNTER — MYAURORA ACCOUNT LINK (OUTPATIENT)
Dept: OTHER | Age: 58
End: 2018-10-16

## 2018-10-16 ENCOUNTER — PRIOR ORIGINAL RECORDS (OUTPATIENT)
Dept: OTHER | Age: 58
End: 2018-10-16

## 2018-10-24 ENCOUNTER — IMMUNIZATION (OUTPATIENT)
Dept: FAMILY MEDICINE CLINIC | Facility: CLINIC | Age: 58
End: 2018-10-24
Payer: COMMERCIAL

## 2018-10-24 DIAGNOSIS — Z23 NEED FOR VACCINATION: ICD-10-CM

## 2018-10-24 PROCEDURE — 90471 IMMUNIZATION ADMIN: CPT | Performed by: FAMILY MEDICINE

## 2018-10-24 PROCEDURE — 90686 IIV4 VACC NO PRSV 0.5 ML IM: CPT | Performed by: FAMILY MEDICINE

## 2018-12-18 ENCOUNTER — OFFICE VISIT (OUTPATIENT)
Dept: FAMILY MEDICINE CLINIC | Facility: CLINIC | Age: 58
End: 2018-12-18
Payer: COMMERCIAL

## 2018-12-18 VITALS
SYSTOLIC BLOOD PRESSURE: 110 MMHG | WEIGHT: 234 LBS | DIASTOLIC BLOOD PRESSURE: 82 MMHG | HEART RATE: 80 BPM | HEIGHT: 71 IN | RESPIRATION RATE: 17 BRPM | BODY MASS INDEX: 32.76 KG/M2

## 2018-12-18 DIAGNOSIS — G89.29 CHRONIC HAND PAIN, RIGHT: Primary | ICD-10-CM

## 2018-12-18 DIAGNOSIS — E78.5 HYPERLIPIDEMIA, UNSPECIFIED HYPERLIPIDEMIA TYPE: ICD-10-CM

## 2018-12-18 DIAGNOSIS — I10 ESSENTIAL HYPERTENSION: ICD-10-CM

## 2018-12-18 DIAGNOSIS — M79.641 CHRONIC HAND PAIN, RIGHT: Primary | ICD-10-CM

## 2018-12-18 PROCEDURE — 99212 OFFICE O/P EST SF 10 MIN: CPT | Performed by: FAMILY MEDICINE

## 2018-12-18 PROCEDURE — 99214 OFFICE O/P EST MOD 30 MIN: CPT | Performed by: FAMILY MEDICINE

## 2018-12-18 RX ORDER — ROSUVASTATIN CALCIUM 20 MG/1
20 TABLET, COATED ORAL NIGHTLY
Refills: 0 | COMMUNITY
Start: 2018-11-15 | End: 2020-08-24 | Stop reason: DRUGHIGH

## 2018-12-18 NOTE — PATIENT INSTRUCTIONS
Medication reviewed and renewed where needed and appropriate. Comply with medications. Monitor blood pressures and record at home. Limit salt intake. Encouraged physical fitness and daily physical activity daily (PLAY). May need PICA splint.   To ortho

## 2018-12-18 NOTE — PROGRESS NOTES
HPI:    Patient ID: Matthew Jeffery is a 59year old male. Hypertension   This is a chronic problem. The current episode started more than 1 year ago. The problem is unchanged. Condition status: variable control.  Pertinent negatives include no chest Cardiovascular: Normal rate and regular rhythm. Exam reveals no gallop. Edema not present. Carotid bruit not present. Pulmonary/Chest: Effort normal and breath sounds normal.   Abdominal: Soft.  Bowel sounds are normal.   Musculoskeletal:        Right

## 2019-01-01 ENCOUNTER — EXTERNAL RECORD (OUTPATIENT)
Dept: HEALTH INFORMATION MANAGEMENT | Facility: OTHER | Age: 59
End: 2019-01-01

## 2019-01-02 DIAGNOSIS — I10 ESSENTIAL HYPERTENSION: ICD-10-CM

## 2019-01-02 RX ORDER — AMLODIPINE BESYLATE 2.5 MG/1
TABLET ORAL
Qty: 90 TABLET | Refills: 0 | Status: SHIPPED | OUTPATIENT
Start: 2019-01-02 | End: 2019-04-01

## 2019-01-03 ENCOUNTER — HOSPITAL ENCOUNTER (OUTPATIENT)
Dept: GENERAL RADIOLOGY | Facility: HOSPITAL | Age: 59
Discharge: HOME | End: 2019-01-03
Attending: ORTHOPAEDIC SURGERY
Payer: COMMERCIAL

## 2019-01-03 ENCOUNTER — OFFICE VISIT (OUTPATIENT)
Dept: ORTHOPEDICS CLINIC | Facility: CLINIC | Age: 59
End: 2019-01-03
Payer: COMMERCIAL

## 2019-01-03 DIAGNOSIS — M25.531 RIGHT WRIST PAIN: Primary | ICD-10-CM

## 2019-01-03 DIAGNOSIS — M25.531 RIGHT WRIST PAIN: ICD-10-CM

## 2019-01-03 DIAGNOSIS — M18.11 ARTHRITIS OF CARPOMETACARPAL (CMC) JOINT OF RIGHT THUMB: ICD-10-CM

## 2019-01-03 PROCEDURE — L3923 HFO WITHOUT JOINTS PRE CST: HCPCS | Performed by: ORTHOPAEDIC SURGERY

## 2019-01-03 PROCEDURE — 73110 X-RAY EXAM OF WRIST: CPT | Performed by: ORTHOPAEDIC SURGERY

## 2019-01-03 PROCEDURE — 99243 OFF/OP CNSLTJ NEW/EST LOW 30: CPT | Performed by: ORTHOPAEDIC SURGERY

## 2019-01-03 PROCEDURE — 99212 OFFICE O/P EST SF 10 MIN: CPT | Performed by: ORTHOPAEDIC SURGERY

## 2019-01-03 NOTE — PROGRESS NOTES
1/3/2019  Shannon Salmeron  3/19/1960  59year old   male  Katiana Banks MD    HPI:   Patient presents with:  Hand Pain: Was referred by Dr. Edward Tracy.   C/o right hand pain when trying to open a jar, shaking hands, or even buttoning a shirt for about a oz      Comment: 1 drink daily    Drug use: No          REVIEW OF SYSTEMS:   A 12 point review of systems was performed as documented on the intake form and reviewed by me today with pertinent positives and negatives listed in the HPI.     EXAM:   There wer indications, benefits, and procedures of both operative and non-operative treatment were discussed with the patient. Non-operative treatment was selected and will include OTC medication, Epsom salt soaks, and a short opponens splint.   The patient was in

## 2019-01-09 ENCOUNTER — OFFICE VISIT (OUTPATIENT)
Dept: FAMILY MEDICINE CLINIC | Facility: CLINIC | Age: 59
End: 2019-01-09
Payer: COMMERCIAL

## 2019-01-09 VITALS
HEIGHT: 71 IN | TEMPERATURE: 98 F | WEIGHT: 225 LBS | HEART RATE: 77 BPM | BODY MASS INDEX: 31.5 KG/M2 | DIASTOLIC BLOOD PRESSURE: 95 MMHG | SYSTOLIC BLOOD PRESSURE: 139 MMHG

## 2019-01-09 DIAGNOSIS — B02.9 HERPES ZOSTER WITHOUT COMPLICATION: Primary | ICD-10-CM

## 2019-01-09 PROCEDURE — 99212 OFFICE O/P EST SF 10 MIN: CPT | Performed by: FAMILY MEDICINE

## 2019-01-09 PROCEDURE — 99213 OFFICE O/P EST LOW 20 MIN: CPT | Performed by: FAMILY MEDICINE

## 2019-01-09 RX ORDER — TRAMADOL HYDROCHLORIDE 50 MG/1
50 TABLET ORAL NIGHTLY
Qty: 15 TABLET | Refills: 0 | Status: SHIPPED | OUTPATIENT
Start: 2019-01-09 | End: 2019-08-22

## 2019-01-09 RX ORDER — VALACYCLOVIR HYDROCHLORIDE 1 G/1
1 TABLET, FILM COATED ORAL 3 TIMES DAILY
Qty: 21 TABLET | Refills: 0 | Status: SHIPPED | OUTPATIENT
Start: 2019-01-09 | End: 2019-01-16

## 2019-01-09 NOTE — PROGRESS NOTES
HPI:    Drew Mahan is a 59year old male presents to clinic with a 2 day history of an itchy rash over the right side of his lower back. Started Sunday night, patient initially thought he had a spider bite.   Over the next 2 days he noticed rash Height: 5' 11\" (1.803 m)     Physical Exam   Constitutional: No distress. Cardiovascular: Normal rate, regular rhythm and normal heart sounds. Pulmonary/Chest: Effort normal and breath sounds normal. No respiratory distress. He has no wheezes.  He ha

## 2019-02-28 VITALS
SYSTOLIC BLOOD PRESSURE: 134 MMHG | BODY MASS INDEX: 33.5 KG/M2 | HEART RATE: 77 BPM | DIASTOLIC BLOOD PRESSURE: 70 MMHG | HEIGHT: 70 IN | WEIGHT: 234 LBS

## 2019-02-28 VITALS
BODY MASS INDEX: 33.79 KG/M2 | SYSTOLIC BLOOD PRESSURE: 120 MMHG | DIASTOLIC BLOOD PRESSURE: 64 MMHG | HEART RATE: 74 BPM | WEIGHT: 236 LBS | HEIGHT: 70 IN

## 2019-03-28 ENCOUNTER — OFFICE VISIT (OUTPATIENT)
Dept: FAMILY MEDICINE CLINIC | Facility: CLINIC | Age: 59
End: 2019-03-28
Payer: COMMERCIAL

## 2019-03-28 VITALS
SYSTOLIC BLOOD PRESSURE: 154 MMHG | HEART RATE: 74 BPM | BODY MASS INDEX: 33.32 KG/M2 | DIASTOLIC BLOOD PRESSURE: 106 MMHG | RESPIRATION RATE: 17 BRPM | HEIGHT: 71 IN | WEIGHT: 238 LBS

## 2019-03-28 DIAGNOSIS — S39.011A ABDOMINAL MUSCLE STRAIN, INITIAL ENCOUNTER: Primary | ICD-10-CM

## 2019-03-28 PROCEDURE — 99213 OFFICE O/P EST LOW 20 MIN: CPT | Performed by: FAMILY MEDICINE

## 2019-03-28 PROCEDURE — 99212 OFFICE O/P EST SF 10 MIN: CPT | Performed by: FAMILY MEDICINE

## 2019-03-28 NOTE — PATIENT INSTRUCTIONS
Consider Medrol Dosepak. If symptoms persist we will consider physical therapy. Patient was encouraged to take anti-inflammatory at least twice daily pain or no pain for the next 10 days with meals in order to try to resolve the inflammation.

## 2019-03-28 NOTE — PROGRESS NOTES
HPI:    Patient ID: Drew Mahan is a 62year old male.     Patient is a 57-year-old  male recently returned from a golfing trip with several rounds being played on consecutive days now experiencing a 2 out of 10 discomfort bilaterally anteri Constitutional: He appears well-developed and well-nourished. Cardiovascular: Normal rate and regular rhythm. Pulmonary/Chest: Effort normal and breath sounds normal. No respiratory distress.    Abdominal: There is tenderness in the right lower quadran

## 2019-04-01 DIAGNOSIS — I10 ESSENTIAL HYPERTENSION: ICD-10-CM

## 2019-04-02 RX ORDER — AMLODIPINE BESYLATE 2.5 MG/1
TABLET ORAL
Qty: 90 TABLET | Refills: 0 | Status: SHIPPED | OUTPATIENT
Start: 2019-04-02 | End: 2019-07-01

## 2019-04-02 NOTE — TELEPHONE ENCOUNTER
Hypertensive Medications  Protocol Criteria:  · Appointment scheduled in the past 6 months or in the next 3 months  · BMP or CMP in the past 12 months  · Creatinine result < 2  Recent Outpatient Visits            4 days ago Abdominal muscle strain, initial protocol. Please advise. Thank you.

## 2019-04-15 RX ORDER — AMLODIPINE BESYLATE 2.5 MG/1
TABLET ORAL
COMMUNITY
Start: 2018-04-05 | End: 2019-10-22 | Stop reason: SDUPTHER

## 2019-04-15 RX ORDER — ROSUVASTATIN CALCIUM 20 MG/1
TABLET, COATED ORAL
COMMUNITY
Start: 2018-12-20 | End: 2019-09-09 | Stop reason: SDUPTHER

## 2019-07-01 DIAGNOSIS — I10 ESSENTIAL HYPERTENSION: ICD-10-CM

## 2019-07-02 RX ORDER — AMLODIPINE BESYLATE 2.5 MG/1
TABLET ORAL
Qty: 90 TABLET | Refills: 1 | Status: SHIPPED | OUTPATIENT
Start: 2019-07-02 | End: 2019-12-30

## 2019-07-02 NOTE — TELEPHONE ENCOUNTER
Please review; protocol failed.     Requested Prescriptions     Pending Prescriptions Disp Refills   • AMLODIPINE BESYLATE 2.5 MG Oral Tab [Pharmacy Med Name: AMLODIPINE BESYLATE 2.5MG TABLETS] 90 tablet 0     Sig: TAKE 1 TABLET(2.5 MG) BY MOUTH DAILY

## 2019-08-22 ENCOUNTER — APPOINTMENT (OUTPATIENT)
Dept: LAB | Age: 59
End: 2019-08-22
Attending: FAMILY MEDICINE
Payer: COMMERCIAL

## 2019-08-22 ENCOUNTER — OFFICE VISIT (OUTPATIENT)
Dept: FAMILY MEDICINE CLINIC | Facility: CLINIC | Age: 59
End: 2019-08-22
Payer: COMMERCIAL

## 2019-08-22 VITALS
WEIGHT: 263 LBS | HEART RATE: 65 BPM | BODY MASS INDEX: 36.82 KG/M2 | TEMPERATURE: 99 F | RESPIRATION RATE: 17 BRPM | SYSTOLIC BLOOD PRESSURE: 114 MMHG | HEIGHT: 71 IN | DIASTOLIC BLOOD PRESSURE: 82 MMHG

## 2019-08-22 DIAGNOSIS — Z23 NEED FOR ZOSTER VACCINATION: ICD-10-CM

## 2019-08-22 DIAGNOSIS — Z00.00 ROUTINE PHYSICAL EXAMINATION: Primary | ICD-10-CM

## 2019-08-22 DIAGNOSIS — Z13.220 LIPID SCREENING: ICD-10-CM

## 2019-08-22 DIAGNOSIS — Z13.29 THYROID DISORDER SCREEN: ICD-10-CM

## 2019-08-22 DIAGNOSIS — Z12.5 PROSTATE CANCER SCREENING: ICD-10-CM

## 2019-08-22 DIAGNOSIS — I10 ESSENTIAL HYPERTENSION: ICD-10-CM

## 2019-08-22 DIAGNOSIS — I10 HYPERTENSION, ESSENTIAL: ICD-10-CM

## 2019-08-22 PROCEDURE — 93000 ELECTROCARDIOGRAM COMPLETE: CPT | Performed by: FAMILY MEDICINE

## 2019-08-22 PROCEDURE — 99396 PREV VISIT EST AGE 40-64: CPT | Performed by: FAMILY MEDICINE

## 2019-08-22 PROCEDURE — 99214 OFFICE O/P EST MOD 30 MIN: CPT | Performed by: FAMILY MEDICINE

## 2019-08-22 NOTE — PROGRESS NOTES
HPI:    Patient ID: Amisha Gan is a 62year old male.     62year old  male here for complete preventive care physical and for status update on any confirmed chronic medical illnesses and follow up on any previous labs or procedures that we normal. He exhibits no distension. There is no tenderness. Neurological: He is alert and oriented to person, place, and time. No cranial nerve deficit, sensory deficit or motor deficit. ASSESSMENT/PLAN:   1.  Routine physical examination  Gen [id number]

## 2019-09-06 ENCOUNTER — TELEPHONE (OUTPATIENT)
Dept: FAMILY MEDICINE CLINIC | Facility: CLINIC | Age: 59
End: 2019-09-06

## 2019-09-06 NOTE — TELEPHONE ENCOUNTER
Colin Weaver has an appointment with his cardiologist in October. He is requesting his most recent lab results and EKG to bring with him to the appointment. Also, do we have the Shingles vaccine in yet?  Please let Colin Weaver know when the results are ready for pic

## 2019-09-07 NOTE — TELEPHONE ENCOUNTER
Copy of most recent labs and EKG at  for , LM on pt's VM with this info and notified pt that at this time we do not have the singles vaccine in.  No further action needed

## 2019-09-10 RX ORDER — ROSUVASTATIN CALCIUM 20 MG/1
TABLET, COATED ORAL
Qty: 90 TABLET | Refills: 0 | Status: SHIPPED | OUTPATIENT
Start: 2019-09-10 | End: 2019-10-22 | Stop reason: SDUPTHER

## 2019-10-22 ENCOUNTER — OFFICE VISIT (OUTPATIENT)
Dept: CARDIOLOGY | Age: 59
End: 2019-10-22

## 2019-10-22 VITALS
WEIGHT: 240 LBS | SYSTOLIC BLOOD PRESSURE: 140 MMHG | HEIGHT: 71 IN | DIASTOLIC BLOOD PRESSURE: 78 MMHG | BODY MASS INDEX: 33.6 KG/M2 | HEART RATE: 73 BPM

## 2019-10-22 DIAGNOSIS — I10 ESSENTIAL HYPERTENSION: ICD-10-CM

## 2019-10-22 DIAGNOSIS — R00.2 PALPITATIONS: Primary | ICD-10-CM

## 2019-10-22 DIAGNOSIS — I25.10 CORONARY ARTERY CALCIFICATION SEEN ON CT SCAN: ICD-10-CM

## 2019-10-22 PROCEDURE — 99213 OFFICE O/P EST LOW 20 MIN: CPT | Performed by: INTERNAL MEDICINE

## 2019-10-22 PROCEDURE — 3078F DIAST BP <80 MM HG: CPT | Performed by: INTERNAL MEDICINE

## 2019-10-22 PROCEDURE — 3077F SYST BP >= 140 MM HG: CPT | Performed by: INTERNAL MEDICINE

## 2019-10-22 RX ORDER — ROSUVASTATIN CALCIUM 10 MG/1
20 TABLET, COATED ORAL AT BEDTIME
Qty: 90 TABLET | Refills: 4 | Status: SHIPPED | OUTPATIENT
Start: 2019-10-22 | End: 2020-10-20 | Stop reason: DRUGHIGH

## 2019-10-22 RX ORDER — AMLODIPINE BESYLATE 2.5 MG/1
2.5 TABLET ORAL DAILY
Qty: 90 TABLET | Refills: 3 | Status: SHIPPED | OUTPATIENT
Start: 2019-10-22

## 2019-10-22 ASSESSMENT — PATIENT HEALTH QUESTIONNAIRE - PHQ9
SUM OF ALL RESPONSES TO PHQ9 QUESTIONS 1 AND 2: 0
1. LITTLE INTEREST OR PLEASURE IN DOING THINGS: NOT AT ALL
2. FEELING DOWN, DEPRESSED OR HOPELESS: NOT AT ALL
SUM OF ALL RESPONSES TO PHQ9 QUESTIONS 1 AND 2: 0

## 2019-10-28 ENCOUNTER — IMMUNIZATION (OUTPATIENT)
Dept: FAMILY MEDICINE CLINIC | Facility: CLINIC | Age: 59
End: 2019-10-28
Payer: COMMERCIAL

## 2019-10-28 DIAGNOSIS — Z23 NEED FOR VACCINATION: ICD-10-CM

## 2019-10-28 PROCEDURE — 90471 IMMUNIZATION ADMIN: CPT | Performed by: FAMILY MEDICINE

## 2019-10-28 PROCEDURE — 90686 IIV4 VACC NO PRSV 0.5 ML IM: CPT | Performed by: FAMILY MEDICINE

## 2019-12-04 RX ORDER — ROSUVASTATIN CALCIUM 20 MG/1
TABLET, COATED ORAL
Qty: 90 TABLET | Refills: 0 | OUTPATIENT
Start: 2019-12-04

## 2019-12-30 DIAGNOSIS — I10 ESSENTIAL HYPERTENSION: ICD-10-CM

## 2019-12-30 RX ORDER — AMLODIPINE BESYLATE 2.5 MG/1
TABLET ORAL
Qty: 90 TABLET | Refills: 1 | Status: SHIPPED | OUTPATIENT
Start: 2019-12-30 | End: 2020-06-29

## 2019-12-31 NOTE — TELEPHONE ENCOUNTER
Refill passed per [facility], [facility] protocol.     Requested Prescriptions   Pending Prescriptions Disp Refills   • AMLODIPINE BESYLATE 2.5 MG Oral Tab [Pharmacy Med Name: AMLODIPINE BESYLATE 2.5MG TABLETS] 90 tablet 1     Sig: TAKE 1 TABLET(2.5 MG) BY MOUTH

## 2020-06-29 DIAGNOSIS — I10 ESSENTIAL HYPERTENSION: ICD-10-CM

## 2020-06-29 RX ORDER — AMLODIPINE BESYLATE 2.5 MG/1
TABLET ORAL
Qty: 90 TABLET | Refills: 1 | Status: SHIPPED | OUTPATIENT
Start: 2020-06-29 | End: 2020-12-21

## 2020-08-24 ENCOUNTER — OFFICE VISIT (OUTPATIENT)
Dept: FAMILY MEDICINE CLINIC | Facility: CLINIC | Age: 60
End: 2020-08-24
Payer: COMMERCIAL

## 2020-08-24 VITALS
DIASTOLIC BLOOD PRESSURE: 85 MMHG | SYSTOLIC BLOOD PRESSURE: 120 MMHG | TEMPERATURE: 98 F | HEIGHT: 71 IN | WEIGHT: 239 LBS | BODY MASS INDEX: 33.46 KG/M2 | RESPIRATION RATE: 17 BRPM

## 2020-08-24 DIAGNOSIS — Z23 NEED FOR VARICELLA VACCINE: ICD-10-CM

## 2020-08-24 DIAGNOSIS — I10 ESSENTIAL HYPERTENSION: ICD-10-CM

## 2020-08-24 DIAGNOSIS — F43.29 STRESS AND ADJUSTMENT REACTION: ICD-10-CM

## 2020-08-24 DIAGNOSIS — Z12.5 PROSTATE CANCER SCREENING: ICD-10-CM

## 2020-08-24 DIAGNOSIS — Z00.00 ENCOUNTER FOR PREVENTIVE CARE: Primary | ICD-10-CM

## 2020-08-24 PROBLEM — I25.10 CORONARY ARTERY CALCIFICATION SEEN ON CT SCAN: Status: ACTIVE | Noted: 2019-10-22

## 2020-08-24 PROBLEM — R00.2 PALPITATIONS: Status: ACTIVE | Noted: 2018-04-05

## 2020-08-24 LAB
ALBUMIN SERPL-MCNC: 3.8 G/DL (ref 3.4–5)
ALBUMIN/GLOB SERPL: 1.1 {RATIO} (ref 1–2)
ALP LIVER SERPL-CCNC: 59 U/L (ref 45–117)
ALT SERPL-CCNC: 30 U/L (ref 16–61)
ANION GAP SERPL CALC-SCNC: 5 MMOL/L (ref 0–18)
AST SERPL-CCNC: 19 U/L (ref 15–37)
BASOPHILS # BLD AUTO: 0.04 X10(3) UL (ref 0–0.2)
BASOPHILS NFR BLD AUTO: 0.7 %
BILIRUB SERPL-MCNC: 1 MG/DL (ref 0.1–2)
BILIRUB UR QL: NEGATIVE
BUN BLD-MCNC: 13 MG/DL (ref 7–18)
BUN/CREAT SERPL: 11.6 (ref 10–20)
CALCIUM BLD-MCNC: 9 MG/DL (ref 8.5–10.1)
CHLORIDE SERPL-SCNC: 109 MMOL/L (ref 98–112)
CHOLEST SMN-MCNC: 96 MG/DL (ref ?–200)
CLARITY UR: CLEAR
CO2 SERPL-SCNC: 28 MMOL/L (ref 21–32)
COLOR UR: YELLOW
COMPLEXED PSA SERPL-MCNC: 1.46 NG/ML (ref ?–4)
CREAT BLD-MCNC: 1.12 MG/DL (ref 0.7–1.3)
DEPRECATED RDW RBC AUTO: 41.9 FL (ref 35.1–46.3)
EOSINOPHIL # BLD AUTO: 0.1 X10(3) UL (ref 0–0.7)
EOSINOPHIL NFR BLD AUTO: 1.8 %
ERYTHROCYTE [DISTWIDTH] IN BLOOD BY AUTOMATED COUNT: 12.5 % (ref 11–15)
GLOBULIN PLAS-MCNC: 3.4 G/DL (ref 2.8–4.4)
GLUCOSE BLD-MCNC: 104 MG/DL (ref 70–99)
GLUCOSE UR-MCNC: NEGATIVE MG/DL
HCT VFR BLD AUTO: 44.4 % (ref 39–53)
HDLC SERPL-MCNC: 46 MG/DL (ref 40–59)
HGB BLD-MCNC: 15.4 G/DL (ref 13–17.5)
HGB UR QL STRIP.AUTO: NEGATIVE
IMM GRANULOCYTES # BLD AUTO: 0.02 X10(3) UL (ref 0–1)
IMM GRANULOCYTES NFR BLD: 0.4 %
KETONES UR-MCNC: NEGATIVE MG/DL
LDLC SERPL CALC-MCNC: 41 MG/DL (ref ?–100)
LEUKOCYTE ESTERASE UR QL STRIP.AUTO: NEGATIVE
LYMPHOCYTES # BLD AUTO: 1.45 X10(3) UL (ref 1–4)
LYMPHOCYTES NFR BLD AUTO: 26.1 %
M PROTEIN MFR SERPL ELPH: 7.2 G/DL (ref 6.4–8.2)
MCH RBC QN AUTO: 31.8 PG (ref 26–34)
MCHC RBC AUTO-ENTMCNC: 34.7 G/DL (ref 31–37)
MCV RBC AUTO: 91.5 FL (ref 80–100)
MONOCYTES # BLD AUTO: 0.47 X10(3) UL (ref 0.1–1)
MONOCYTES NFR BLD AUTO: 8.5 %
NEUTROPHILS # BLD AUTO: 3.47 X10 (3) UL (ref 1.5–7.7)
NEUTROPHILS # BLD AUTO: 3.47 X10(3) UL (ref 1.5–7.7)
NEUTROPHILS NFR BLD AUTO: 62.5 %
NITRITE UR QL STRIP.AUTO: NEGATIVE
NONHDLC SERPL-MCNC: 50 MG/DL (ref ?–130)
OSMOLALITY SERPL CALC.SUM OF ELEC: 294 MOSM/KG (ref 275–295)
PATIENT FASTING Y/N/NP: YES
PATIENT FASTING Y/N/NP: YES
PH UR: 6 [PH] (ref 5–8)
PLATELET # BLD AUTO: 185 10(3)UL (ref 150–450)
POTASSIUM SERPL-SCNC: 4.5 MMOL/L (ref 3.5–5.1)
PROT UR-MCNC: NEGATIVE MG/DL
RBC # BLD AUTO: 4.85 X10(6)UL (ref 4.3–5.7)
SODIUM SERPL-SCNC: 142 MMOL/L (ref 136–145)
SP GR UR STRIP: 1.01 (ref 1–1.03)
TRIGL SERPL-MCNC: 43 MG/DL (ref 30–149)
TSI SER-ACNC: 1.54 MIU/ML (ref 0.36–3.74)
UROBILINOGEN UR STRIP-ACNC: <2
VLDLC SERPL CALC-MCNC: 9 MG/DL (ref 0–30)
WBC # BLD AUTO: 5.6 X10(3) UL (ref 4–11)

## 2020-08-24 PROCEDURE — 3074F SYST BP LT 130 MM HG: CPT | Performed by: FAMILY MEDICINE

## 2020-08-24 PROCEDURE — 93000 ELECTROCARDIOGRAM COMPLETE: CPT | Performed by: FAMILY MEDICINE

## 2020-08-24 PROCEDURE — 90750 HZV VACC RECOMBINANT IM: CPT | Performed by: FAMILY MEDICINE

## 2020-08-24 PROCEDURE — 3008F BODY MASS INDEX DOCD: CPT | Performed by: FAMILY MEDICINE

## 2020-08-24 PROCEDURE — 3079F DIAST BP 80-89 MM HG: CPT | Performed by: FAMILY MEDICINE

## 2020-08-24 PROCEDURE — 36415 COLL VENOUS BLD VENIPUNCTURE: CPT | Performed by: FAMILY MEDICINE

## 2020-08-24 PROCEDURE — 99396 PREV VISIT EST AGE 40-64: CPT | Performed by: FAMILY MEDICINE

## 2020-08-24 PROCEDURE — 90471 IMMUNIZATION ADMIN: CPT | Performed by: FAMILY MEDICINE

## 2020-08-24 RX ORDER — ROSUVASTATIN CALCIUM 10 MG/1
10 TABLET, COATED ORAL DAILY
COMMUNITY
Start: 2020-06-03

## 2020-08-24 NOTE — PROGRESS NOTES
HPI:    Patient ID: Evan العراقي is a 56year old male.     56year old  male here for complete preventive care physical and for status update on any confirmed chronic medical illnesses and follow up on any previous labs or procedures that we distress. Neurological: He is alert and oriented to person, place, and time. ASSESSMENT/PLAN:   1. Encounter for preventive care  The following have been ordered. - CBC WITH DIFFERENTIAL WITH PLATELET;  Future  - COMP METABOLIC PANEL (14); F

## 2020-08-24 NOTE — PATIENT INSTRUCTIONS
All adult screening ordered and done appropriate for patient's age and gender and risk factors and complaints. Medication reviewed and renewed where needed and appropriate. Monitor blood pressures and record at home. Limit salt intake.   Encouraged physic

## 2020-08-27 DIAGNOSIS — R73.9 ELEVATED BLOOD SUGAR: Primary | ICD-10-CM

## 2020-10-08 ENCOUNTER — NURSE ONLY (OUTPATIENT)
Dept: FAMILY MEDICINE CLINIC | Facility: CLINIC | Age: 60
End: 2020-10-08
Payer: COMMERCIAL

## 2020-10-08 DIAGNOSIS — Z00.00 PHYSICAL EXAM, ANNUAL: Primary | ICD-10-CM

## 2020-10-08 PROCEDURE — 90686 IIV4 VACC NO PRSV 0.5 ML IM: CPT | Performed by: FAMILY MEDICINE

## 2020-10-08 PROCEDURE — 90471 IMMUNIZATION ADMIN: CPT | Performed by: FAMILY MEDICINE

## 2020-10-20 ENCOUNTER — OFFICE VISIT (OUTPATIENT)
Dept: CARDIOLOGY | Age: 60
End: 2020-10-20

## 2020-10-20 VITALS
SYSTOLIC BLOOD PRESSURE: 118 MMHG | BODY MASS INDEX: 32.9 KG/M2 | DIASTOLIC BLOOD PRESSURE: 80 MMHG | HEIGHT: 71 IN | WEIGHT: 235 LBS | HEART RATE: 76 BPM

## 2020-10-20 DIAGNOSIS — I10 ESSENTIAL HYPERTENSION: Primary | ICD-10-CM

## 2020-10-20 DIAGNOSIS — I25.10 CORONARY ARTERY CALCIFICATION SEEN ON CT SCAN: ICD-10-CM

## 2020-10-20 PROCEDURE — 99213 OFFICE O/P EST LOW 20 MIN: CPT | Performed by: INTERNAL MEDICINE

## 2020-10-20 RX ORDER — ROSUVASTATIN CALCIUM 10 MG/1
10 TABLET, COATED ORAL AT BEDTIME
COMMUNITY
Start: 2020-06-03 | End: 2021-02-26

## 2020-10-20 SDOH — HEALTH STABILITY: PHYSICAL HEALTH: ON AVERAGE, HOW MANY MINUTES DO YOU ENGAGE IN EXERCISE AT THIS LEVEL?: 0 MIN

## 2020-10-20 SDOH — SOCIAL STABILITY: SOCIAL NETWORK: ARE YOU MARRIED, WIDOWED, DIVORCED, SEPARATED, NEVER MARRIED, OR LIVING WITH A PARTNER?: MARRIED

## 2020-10-20 SDOH — HEALTH STABILITY: PHYSICAL HEALTH: ON AVERAGE, HOW MANY DAYS PER WEEK DO YOU ENGAGE IN MODERATE TO STRENUOUS EXERCISE (LIKE A BRISK WALK)?: 0 DAYS

## 2020-10-20 ASSESSMENT — PATIENT HEALTH QUESTIONNAIRE - PHQ9
2. FEELING DOWN, DEPRESSED OR HOPELESS: NOT AT ALL
CLINICAL INTERPRETATION OF PHQ9 SCORE: NO FURTHER SCREENING NEEDED
SUM OF ALL RESPONSES TO PHQ9 QUESTIONS 1 AND 2: 0
CLINICAL INTERPRETATION OF PHQ2 SCORE: NO FURTHER SCREENING NEEDED
SUM OF ALL RESPONSES TO PHQ9 QUESTIONS 1 AND 2: 0
1. LITTLE INTEREST OR PLEASURE IN DOING THINGS: NOT AT ALL

## 2020-12-21 DIAGNOSIS — I10 ESSENTIAL HYPERTENSION: ICD-10-CM

## 2020-12-21 RX ORDER — AMLODIPINE BESYLATE 2.5 MG/1
2.5 TABLET ORAL DAILY
Qty: 90 TABLET | Refills: 1 | Status: SHIPPED | OUTPATIENT
Start: 2020-12-21 | End: 2021-06-17

## 2020-12-21 NOTE — TELEPHONE ENCOUNTER
•  AMLODIPINE BESYLATE 2.5 MG Oral Tab, TAKE 1 TABLET(2.5 MG) BY MOUTH DAILY, Disp: 90 tablet, Rfl: 1

## 2021-02-11 NOTE — TELEPHONE ENCOUNTER
----- Message from Nena Dotson DO sent at 4/4/2018  1:42 PM CDT -----  Holter confirms that some of his beats are being generated apart from the normal pacemaker. Recommend cardiology follow up. Referral on chart. agree with above hpi  on my exam  vital signs: T(C): 36.7 (02-11-21 @ 17:24), Max: 36.7 (02-11-21 @ 17:24)  HR: 123 (02-11-21 @ 18:14) (123 - 140)  BP: 116/68 (02-11-21 @ 18:04) (116/68 - 119/76)  BP(mean): --  RR: 18 (02-11-21 @ 18:04) (18 - 18)  SpO2: 100% (02-11-21 @ 18:04) (98% - 100%)  physical above by me  Patient presents to the ED for chest mass which has been present for several months and expanding. Initially told it was hematoma but based on prior imaging appears to be heterogenous mass. Per daughter and patient was supposed to be biopsied but due to covid did not happen. Patient states mass has increased in size, is more painful. Since dc from hospital last month has not been eating or drinking well and has been generally weak. Denies any fevers, cough, cp (other than by mass), sob, abd pain, vomiting, diarrhea, dysuria. Patient noted to have afib with rvr with range of hr 118-140's, appears very dry, chest wall mass with erythema localized to mass only, does not spread anywhere else. BP ok. Plan for labs, cxr, ct with iv constrast,  hydration, tele monitoring, evaluate for anemia, elec disturbance, organ dysfunction, evaluate mass to assess if concurrent hematoma v. invasion, monitor, reass. agree with above hpi  on my exam  vital signs: T(C): 36.7 (02-11-21 @ 17:24), Max: 36.7 (02-11-21 @ 17:24)  HR: 123 (02-11-21 @ 18:14) (123 - 140)  BP: 116/68 (02-11-21 @ 18:04) (116/68 - 119/76)  BP(mean): --  RR: 18 (02-11-21 @ 18:04) (18 - 18)  SpO2: 100% (02-11-21 @ 18:04) (98% - 100%)  physical above by me  Patient presents to the ED for chest mass which has been present for several months and expanding. Initially told it was hematoma but based on prior imaging appears to be heterogenous mass. Per daughter and patient was supposed to be biopsied but due to covid did not happen. Patient states mass has increased in size, is more painful. Since dc from hospital last month has not been eating or drinking well and has been generally weak. Denies any fevers, cough, cp (other than by mass), sob, abd pain, vomiting, diarrhea, dysuria. Patient noted to have afib with rvr with range of hr 118-140's, appears very dry, chest wall mass with erythema localized to mass only, does not spread anywhere else. BP ok. Plan for labs, cxr, ct with iv constrast,  hydration, tele monitoring, evaluate for anemia, elec disturbance, organ dysfunction, evaluate mass to assess if concurrent hematoma v. invasion, monitor, reass. will hydrate and reeval hr, will be due for his metoprolol soon, will administer pending initial hydration reassessment.

## 2021-02-26 RX ORDER — ROSUVASTATIN CALCIUM 10 MG/1
TABLET, COATED ORAL
Qty: 90 TABLET | Refills: 3 | Status: SHIPPED | OUTPATIENT
Start: 2021-02-26

## 2021-03-01 ENCOUNTER — TELEPHONE (OUTPATIENT)
Dept: FAMILY MEDICINE CLINIC | Facility: CLINIC | Age: 61
End: 2021-03-01

## 2021-03-01 DIAGNOSIS — Z12.11 COLON CANCER SCREENING: Primary | ICD-10-CM

## 2021-03-01 NOTE — TELEPHONE ENCOUNTER
Patient is needing an order for a colonoscopy put in,, he works in our building here and would just like to  the order when it is in the system.   Please call patient when order is in.     pls advise

## 2021-03-04 NOTE — TELEPHONE ENCOUNTER
Left vm for pt that order is on chart. Can  from office as he works in [address] [place] [address]. No further action needed.

## 2021-04-15 ENCOUNTER — TELEPHONE (OUTPATIENT)
Dept: CARDIOLOGY | Age: 61
End: 2021-04-15

## 2021-04-15 ENCOUNTER — TELEPHONE (OUTPATIENT)
Dept: GASTROENTEROLOGY | Facility: CLINIC | Age: 61
End: 2021-04-15

## 2021-04-15 ENCOUNTER — OFFICE VISIT (OUTPATIENT)
Dept: GASTROENTEROLOGY | Facility: CLINIC | Age: 61
End: 2021-04-15
Payer: COMMERCIAL

## 2021-04-15 VITALS
SYSTOLIC BLOOD PRESSURE: 148 MMHG | DIASTOLIC BLOOD PRESSURE: 80 MMHG | WEIGHT: 240 LBS | TEMPERATURE: 99 F | BODY MASS INDEX: 33.6 KG/M2 | HEIGHT: 71 IN | HEART RATE: 73 BPM

## 2021-04-15 DIAGNOSIS — Z80.0 FH: COLON CANCER: ICD-10-CM

## 2021-04-15 DIAGNOSIS — I49.3 PVC (PREMATURE VENTRICULAR CONTRACTION): Primary | ICD-10-CM

## 2021-04-15 DIAGNOSIS — Z12.11 COLON CANCER SCREENING: Primary | ICD-10-CM

## 2021-04-15 DIAGNOSIS — Z12.11 COLON CANCER SCREENING: ICD-10-CM

## 2021-04-15 DIAGNOSIS — Z12.11 SCREEN FOR COLON CANCER: ICD-10-CM

## 2021-04-15 PROCEDURE — S0285 CNSLT BEFORE SCREEN COLONOSC: HCPCS | Performed by: INTERNAL MEDICINE

## 2021-04-15 PROCEDURE — 3079F DIAST BP 80-89 MM HG: CPT | Performed by: INTERNAL MEDICINE

## 2021-04-15 PROCEDURE — 3077F SYST BP >= 140 MM HG: CPT | Performed by: INTERNAL MEDICINE

## 2021-04-15 PROCEDURE — 3008F BODY MASS INDEX DOCD: CPT | Performed by: INTERNAL MEDICINE

## 2021-04-15 RX ORDER — BIOTIN 1 MG
1 TABLET ORAL DAILY
COMMUNITY

## 2021-04-15 RX ORDER — SODIUM, POTASSIUM,MAG SULFATES 17.5-3.13G
SOLUTION, RECONSTITUTED, ORAL ORAL
Qty: 1 BOTTLE | Refills: 0 | Status: SHIPPED | OUTPATIENT
Start: 2021-04-15

## 2021-04-15 RX ORDER — TURMERIC 400 MG
CAPSULE ORAL
COMMUNITY

## 2021-04-15 NOTE — TELEPHONE ENCOUNTER
GI Clinical Staff:   Patient going for c-scope on 5/21    He needs cardiac clearance from Dr. Breanna Castelan (cards at [facility]). It seems an echo stress test was ordered by Dr. Rinku Yeh in Oct 2020 which patient says he doesn't believe he was asked to do.

## 2021-04-15 NOTE — H&P
[address] [facility] [address] Gastroenterology                                                                                                  Clinic History and Physical     Pa Alcohol use:  Yes      Alcohol/week: 0.0 standard drinks      Comment: 1 drink daily    Drug use: No       Medications (Active prior to today's visit):  Current Outpatient Medications   Medication Sig Dispense Refill   • Turmeric 400 MG Oral Cap Take by juan warm and well perfused   Lung- Moves air well;  No labored breathing  Abdomen- soft, non-tender exam in all quadrants without rigidity or guarding, non-distended, no abnormal bowel sounds noted, no masses are palpated  Skin- No jaundice  Ext: no cyanosis, c Referrals:  None         VLADIMIR Benz MD  Pager: [phone]  4/15/2021

## 2021-04-15 NOTE — TELEPHONE ENCOUNTER
Scheduled for: Colonoscopy [id number]  Provider Name: Dr Isabella Felty    Date: Fri 5/21/2021   Location: [facility]  Sedation: MAC  Time: 2:00 pm, pt is aware that [facility] will call day before procedure with arrival time  Prep:  Split dose suprep  Meds/Allergies Reconciled?:  N

## 2021-04-15 NOTE — PATIENT INSTRUCTIONS
1. Schedule colonoscopy with MAC [Diagnosis: screening] - May 21st    2.  bowel prep from pharmacy (split suprep)    3. Continue all medications for procedure  >>>We will contact Dr. Dana Patel (cardiology) to get cardiac clearance    4.  Read all bowel

## 2021-04-19 NOTE — TELEPHONE ENCOUNTER
Received fax from Dr. Jermaine Owens. Patient is cleared for procedure on 5/21/21, no stress test needed. FYI sent to Dr. Bonita Claire.

## 2021-05-17 NOTE — TELEPHONE ENCOUNTER
Pt called in stating medication needs to please be resent to pharmacy they have not received it yet. Na Sulfate-K Sulfate-Mg Sulf (SUPREP BOWEL PREP KIT) 17.5-3.13-1.6 GM/177ML Oral Solution.  Please follow up

## 2021-05-19 ENCOUNTER — LAB REQUISITION (OUTPATIENT)
Dept: LAB | Facility: HOSPITAL | Age: 61
End: 2021-05-19
Payer: COMMERCIAL

## 2021-05-19 DIAGNOSIS — Z01.818 ENCOUNTER FOR OTHER PREPROCEDURAL EXAMINATION: ICD-10-CM

## 2021-05-21 ENCOUNTER — SURGERY CENTER DOCUMENTATION (OUTPATIENT)
Dept: SURGERY | Age: 61
End: 2021-05-21

## 2021-05-21 ENCOUNTER — LAB REQUISITION (OUTPATIENT)
Dept: LAB | Facility: HOSPITAL | Age: 61
End: 2021-05-21
Payer: COMMERCIAL

## 2021-05-21 DIAGNOSIS — K63.5 POLYP OF COLON, UNSPECIFIED PART OF COLON, UNSPECIFIED TYPE: ICD-10-CM

## 2021-05-21 DIAGNOSIS — Z12.11 ENCOUNTER FOR SCREENING FOR MALIGNANT NEOPLASM OF COLON: ICD-10-CM

## 2021-05-21 PROCEDURE — 88305 TISSUE EXAM BY PATHOLOGIST: CPT | Performed by: INTERNAL MEDICINE

## 2021-05-21 PROCEDURE — 45385 COLONOSCOPY W/LESION REMOVAL: CPT | Performed by: INTERNAL MEDICINE

## 2021-05-21 PROCEDURE — 45380 COLONOSCOPY AND BIOPSY: CPT | Performed by: INTERNAL MEDICINE

## 2021-05-21 NOTE — PROCEDURES
COLONOSCOPY REPORT    Renee Shaw     3/19/1960 Age 59year old   PCP Fiordaliza Palm DO Endoscopist Pavel Blanco MD     Date of procedure: 21    [facility]: [facility])    Procedure: Colonoscopy w biopsy polypectomy and retrieved. C. 3 mm polyp in the sigmoid colon; flat morphology; cold biopsy polypectomy and retrieved. 2. Diverticulosis: moderate in the left colon.     3. Terminal ileum: the visualized mucosa appeared normal.    4. A retrof

## 2021-05-24 ENCOUNTER — TELEPHONE (OUTPATIENT)
Dept: GASTROENTEROLOGY | Facility: CLINIC | Age: 61
End: 2021-05-24

## 2021-05-24 NOTE — TELEPHONE ENCOUNTER
Results letter mailed to patient per Dr. Kevin Morejon recall entered for repeat in 5 yrs, 5/21/2026  Colon done in 5/21/2021  HM Updated and Patient Outreach was placed for Colon recall.     Nieves Mckenzie MD  P Em Gi Clinical Staff  GI staff: please plac

## 2021-06-03 ENCOUNTER — TELEPHONE (OUTPATIENT)
Dept: GASTROENTEROLOGY | Facility: CLINIC | Age: 61
End: 2021-06-03

## 2021-06-03 NOTE — TELEPHONE ENCOUNTER
Contacted patient, verified . Patient states he hasn't received result letter yet. I read letter to patient and told him I can send another copy to his home address. Answered all questions, patient appreciative of call.

## 2021-06-16 DIAGNOSIS — I10 ESSENTIAL HYPERTENSION: ICD-10-CM

## 2021-06-17 RX ORDER — AMLODIPINE BESYLATE 2.5 MG/1
TABLET ORAL
Qty: 90 TABLET | Refills: 1 | Status: SHIPPED | OUTPATIENT
Start: 2021-06-17 | End: 2021-12-09

## 2021-09-27 ENCOUNTER — TELEPHONE (OUTPATIENT)
Dept: INTERNAL MEDICINE CLINIC | Facility: CLINIC | Age: 61
End: 2021-09-27

## 2021-09-27 DIAGNOSIS — Z00.00 ROUTINE PHYSICAL EXAMINATION: Primary | ICD-10-CM

## 2021-09-27 PROCEDURE — 93000 ELECTROCARDIOGRAM COMPLETE: CPT | Performed by: FAMILY MEDICINE

## 2021-09-27 NOTE — TELEPHONE ENCOUNTER
Would like orders for bloodwork and a EKG entered prior to his appt with his cardiologist which is in two weeks.

## 2021-09-30 NOTE — TELEPHONE ENCOUNTER
Called patient no answer left detailed message in regards to orders in place for labs as well as EKG order. If any other questions or concerns may contact the office.

## 2021-10-08 ENCOUNTER — LAB ENCOUNTER (OUTPATIENT)
Dept: LAB | Age: 61
End: 2021-10-08
Attending: FAMILY MEDICINE
Payer: COMMERCIAL

## 2021-10-08 ENCOUNTER — EKG ENCOUNTER (OUTPATIENT)
Dept: LAB | Age: 61
End: 2021-10-08
Attending: FAMILY MEDICINE
Payer: COMMERCIAL

## 2021-10-08 DIAGNOSIS — Z00.00 ROUTINE GENERAL MEDICAL EXAMINATION AT A HEALTH CARE FACILITY: Primary | ICD-10-CM

## 2021-10-08 DIAGNOSIS — Z00.00 ROUTINE PHYSICAL EXAMINATION: ICD-10-CM

## 2021-10-08 PROCEDURE — 93005 ELECTROCARDIOGRAM TRACING: CPT

## 2021-10-08 PROCEDURE — 80061 LIPID PANEL: CPT

## 2021-10-08 PROCEDURE — 80053 COMPREHEN METABOLIC PANEL: CPT

## 2021-10-08 PROCEDURE — 36415 COLL VENOUS BLD VENIPUNCTURE: CPT

## 2021-10-08 PROCEDURE — 84443 ASSAY THYROID STIM HORMONE: CPT

## 2021-10-08 PROCEDURE — 93010 ELECTROCARDIOGRAM REPORT: CPT | Performed by: FAMILY MEDICINE

## 2021-10-08 PROCEDURE — 81001 URINALYSIS AUTO W/SCOPE: CPT

## 2021-10-08 PROCEDURE — 85025 COMPLETE CBC W/AUTO DIFF WBC: CPT

## 2021-10-26 ENCOUNTER — APPOINTMENT (OUTPATIENT)
Dept: CARDIOLOGY | Age: 61
End: 2021-10-26

## 2021-11-18 ENCOUNTER — OFFICE VISIT (OUTPATIENT)
Dept: FAMILY MEDICINE CLINIC | Facility: CLINIC | Age: 61
End: 2021-11-18
Payer: COMMERCIAL

## 2021-11-18 VITALS
BODY MASS INDEX: 33.6 KG/M2 | SYSTOLIC BLOOD PRESSURE: 128 MMHG | WEIGHT: 240 LBS | DIASTOLIC BLOOD PRESSURE: 82 MMHG | RESPIRATION RATE: 16 BRPM | HEIGHT: 71 IN | TEMPERATURE: 99 F

## 2021-11-18 DIAGNOSIS — I10 ESSENTIAL HYPERTENSION: ICD-10-CM

## 2021-11-18 DIAGNOSIS — Z00.00 ROUTINE PHYSICAL EXAMINATION: Primary | ICD-10-CM

## 2021-11-18 DIAGNOSIS — L85.8 KERATOSIS PILARIS: ICD-10-CM

## 2021-11-18 DIAGNOSIS — R31.29 MICROSCOPIC HEMATURIA: ICD-10-CM

## 2021-11-18 DIAGNOSIS — E78.5 HYPERLIPIDEMIA, UNSPECIFIED HYPERLIPIDEMIA TYPE: ICD-10-CM

## 2021-11-18 PROCEDURE — 3074F SYST BP LT 130 MM HG: CPT | Performed by: FAMILY MEDICINE

## 2021-11-18 PROCEDURE — 3079F DIAST BP 80-89 MM HG: CPT | Performed by: FAMILY MEDICINE

## 2021-11-18 PROCEDURE — 99396 PREV VISIT EST AGE 40-64: CPT | Performed by: FAMILY MEDICINE

## 2021-11-18 PROCEDURE — 3008F BODY MASS INDEX DOCD: CPT | Performed by: FAMILY MEDICINE

## 2021-11-18 RX ORDER — AMMONIUM LACTATE 12 G/100G
1 LOTION TOPICAL AS NEEDED
Qty: 220 ML | Refills: 2 | Status: SHIPPED | OUTPATIENT
Start: 2021-11-18

## 2021-11-18 NOTE — PROGRESS NOTES
Subjective:   Patient ID: Anson Leiva is a 59year old male.     This patient is a 41-year-old  male here for complete preventive care physical and for status update on any confirmed chronic medical illnesses and follow up on any previous la a day to involved areas 385 g 1   • triamcinolone acetonide 0.1 % External Cream Apply topically 2 (two) times daily as needed.  453.6 g 0     Allergies:No Known Allergies    Objective:     11/18/21  1347   BP: 128/82   Resp: 16   Temp: 99 °F (37.2 °C) for Dry Skin. Imaging & Referrals:  None   Patient Instructions   All adult screening ordered and done appropriate for patient's age and gender and risk factors and complaints. Monitor blood pressures and record at home. Limit salt intake.   Encourag

## 2021-11-18 NOTE — PATIENT INSTRUCTIONS
All adult screening ordered and done appropriate for patient's age and gender and risk factors and complaints. Monitor blood pressures and record at home. Limit salt intake. Encouraged physical fitness and daily physical activity daily.   Recommend weight

## 2021-12-08 DIAGNOSIS — I10 ESSENTIAL HYPERTENSION: ICD-10-CM

## 2021-12-09 RX ORDER — AMLODIPINE BESYLATE 2.5 MG/1
2.5 TABLET ORAL DAILY
Qty: 90 TABLET | Refills: 1 | Status: SHIPPED | OUTPATIENT
Start: 2021-12-09

## 2021-12-09 NOTE — TELEPHONE ENCOUNTER
Refill passed per [address] protocol.   Requested Prescriptions   Pending Prescriptions Disp Refills    AMLODIPINE 2.5 MG Oral Tab [Pharmacy Med Name: AMLODIPINE BESYLATE 2.5MG TABLETS] 90 tablet 1     Sig: TAKE 1 TABLET(2.5 MG) BY MOUTH DAILY

## 2021-12-23 ENCOUNTER — LAB ENCOUNTER (OUTPATIENT)
Dept: LAB | Age: 61
End: 2021-12-23
Attending: FAMILY MEDICINE
Payer: COMMERCIAL

## 2021-12-23 DIAGNOSIS — R31.29 MICROSCOPIC HEMATURIA: ICD-10-CM

## 2021-12-23 PROCEDURE — 81003 URINALYSIS AUTO W/O SCOPE: CPT

## 2022-02-25 ENCOUNTER — ORDER TRANSCRIPTION (OUTPATIENT)
Dept: ADMINISTRATIVE | Facility: HOSPITAL | Age: 62
End: 2022-02-25

## 2022-04-04 ENCOUNTER — LAB ENCOUNTER (OUTPATIENT)
Dept: LAB | Age: 62
End: 2022-04-04
Attending: INTERNAL MEDICINE
Payer: COMMERCIAL

## 2022-04-04 DIAGNOSIS — Z01.818 PREOP EXAMINATION: ICD-10-CM

## 2022-04-04 DIAGNOSIS — Z11.59 SCREENING FOR VIRAL DISEASE: ICD-10-CM

## 2022-04-04 LAB — SARS-COV-2 RNA RESP QL NAA+PROBE: NOT DETECTED

## 2022-04-07 ENCOUNTER — HOSPITAL ENCOUNTER (OUTPATIENT)
Dept: CV DIAGNOSTICS | Facility: HOSPITAL | Age: 62
Discharge: HOME | End: 2022-04-07
Attending: INTERNAL MEDICINE
Payer: COMMERCIAL

## 2022-04-07 DIAGNOSIS — R00.2 PALPITATION: ICD-10-CM

## 2022-04-07 DIAGNOSIS — I25.10 CORONARY ARTERY CALCIFICATION SEEN ON COMPUTED TOMOGRAPHY: ICD-10-CM

## 2022-04-07 PROCEDURE — 93350 STRESS TTE ONLY: CPT | Performed by: INTERNAL MEDICINE

## 2022-04-07 PROCEDURE — 93018 CV STRESS TEST I&R ONLY: CPT | Performed by: INTERNAL MEDICINE

## 2022-04-07 PROCEDURE — 93017 CV STRESS TEST TRACING ONLY: CPT | Performed by: INTERNAL MEDICINE

## 2022-06-09 DIAGNOSIS — I10 ESSENTIAL HYPERTENSION: ICD-10-CM

## 2022-06-13 RX ORDER — AMLODIPINE BESYLATE 2.5 MG/1
2.5 TABLET ORAL DAILY
Qty: 90 TABLET | Refills: 1 | Status: SHIPPED | OUTPATIENT
Start: 2022-06-13

## 2022-06-13 NOTE — TELEPHONE ENCOUNTER
Please review. Protocol failed / No protocol. 90 day refill given on 06/13/22, appointment needed for further refills.       Requested Prescriptions   Pending Prescriptions Disp Refills    AMLODIPINE 2.5 MG Oral Tab [Pharmacy Med Name: AMLODIPINE BESYLATE 2.5MG TABLETS] 90 tablet 1     Sig: TAKE 1 TABLET(2.5 MG) BY MOUTH DAILY        Hypertensive Medications Protocol Failed - 6/9/2022 10:08 AM        Failed - Appointment in past 6 or next 3 months        Passed - CMP or BMP in past 12 months        Passed - GFR Non- > 50     Lab Results   Component Value Date    GFRNAA 92 10/08/2021                           Recent Outpatient Visits              6 months ago Routine physical examination    [address] Lavenia Magnus, DO    Office Visit    1 year ago PVC (premature ventricular contraction)    [facility], Shaniqua Paez, [address] [place] [address] MD Nelda    Office Visit    1 year ago [facility] exam, [facility]    Nurse Only    1 year ago [facility] for [facility] care    [facility] [place] [place]aven[place] [facility]    Office Visit    2 years ago Routine physical examination    [facility] [place] [place]aven[place] Magnus [place]    Office Visit

## 2022-10-03 ENCOUNTER — TELEPHONE (OUTPATIENT)
Dept: FAMILY MEDICINE CLINIC | Facility: CLINIC | Age: 62
End: 2022-10-03

## 2022-10-03 DIAGNOSIS — Z00.00 ENCOUNTER FOR SCREENING AND PREVENTATIVE CARE: Primary | ICD-10-CM

## 2022-10-03 NOTE — TELEPHONE ENCOUNTER
Valerie Bae has an appointment with Dr. Gunnar Olvera 11/21/22 for his annual physical but he sees his cardiologist the end of October and that doctor would like to see his lab work results. Can orders please be entered for Valerie Bae to get his bloodwork done? Please message Valerie Bae through his [employer] when the orders are entered.

## 2022-10-05 NOTE — TELEPHONE ENCOUNTER
Message left for patient that preventative labs were entered by Dr Reyes Camarena. If he should have any questions feel free to call us.

## 2022-10-05 NOTE — TELEPHONE ENCOUNTER
Requested preventive care labs have been placed on the chart. Please call the patient and let him know. Thank you.

## 2022-10-10 ENCOUNTER — LAB ENCOUNTER (OUTPATIENT)
Dept: LAB | Age: 62
End: 2022-10-10
Attending: FAMILY MEDICINE
Payer: COMMERCIAL

## 2022-10-10 DIAGNOSIS — Z00.00 ENCOUNTER FOR SCREENING AND PREVENTATIVE CARE: ICD-10-CM

## 2022-10-10 LAB
ALBUMIN SERPL-MCNC: 4.1 G/DL (ref 3.4–5)
ALBUMIN/GLOB SERPL: 1.3 {RATIO} (ref 1–2)
ALP LIVER SERPL-CCNC: 60 U/L
ALT SERPL-CCNC: 38 U/L
ANION GAP SERPL CALC-SCNC: 4 MMOL/L (ref 0–18)
AST SERPL-CCNC: 24 U/L (ref 15–37)
BASOPHILS # BLD AUTO: 0.03 X10(3) UL (ref 0–0.2)
BASOPHILS NFR BLD AUTO: 0.5 %
BILIRUB SERPL-MCNC: 0.9 MG/DL (ref 0.1–2)
BILIRUB UR QL: NEGATIVE
BUN BLD-MCNC: 18 MG/DL (ref 7–18)
BUN/CREAT SERPL: 19.4 (ref 10–20)
CALCIUM BLD-MCNC: 9 MG/DL (ref 8.5–10.1)
CHLORIDE SERPL-SCNC: 107 MMOL/L (ref 98–112)
CHOLEST SERPL-MCNC: 110 MG/DL (ref ?–200)
CLARITY UR: CLEAR
CO2 SERPL-SCNC: 28 MMOL/L (ref 21–32)
COLOR UR: YELLOW
COMPLEXED PSA SERPL-MCNC: 4.49 NG/ML (ref ?–4)
CREAT BLD-MCNC: 0.93 MG/DL
DEPRECATED RDW RBC AUTO: 44.1 FL (ref 35.1–46.3)
EOSINOPHIL # BLD AUTO: 0.22 X10(3) UL (ref 0–0.7)
EOSINOPHIL NFR BLD AUTO: 3.4 %
ERYTHROCYTE [DISTWIDTH] IN BLOOD BY AUTOMATED COUNT: 12.9 % (ref 11–15)
FASTING PATIENT LIPID ANSWER: YES
FASTING STATUS PATIENT QL REPORTED: YES
GFR SERPLBLD BASED ON 1.73 SQ M-ARVRAT: 92 ML/MIN/1.73M2 (ref 60–?)
GLOBULIN PLAS-MCNC: 3.2 G/DL (ref 2.8–4.4)
GLUCOSE BLD-MCNC: 101 MG/DL (ref 70–99)
GLUCOSE UR-MCNC: NEGATIVE MG/DL
HCT VFR BLD AUTO: 46.8 %
HDLC SERPL-MCNC: 46 MG/DL (ref 40–59)
HGB BLD-MCNC: 16 G/DL
HGB UR QL STRIP.AUTO: NEGATIVE
IMM GRANULOCYTES # BLD AUTO: 0.02 X10(3) UL (ref 0–1)
IMM GRANULOCYTES NFR BLD: 0.3 %
KETONES UR-MCNC: NEGATIVE MG/DL
LDLC SERPL CALC-MCNC: 49 MG/DL (ref ?–100)
LEUKOCYTE ESTERASE UR QL STRIP.AUTO: NEGATIVE
LYMPHOCYTES # BLD AUTO: 1.91 X10(3) UL (ref 1–4)
LYMPHOCYTES NFR BLD AUTO: 29.8 %
MCH RBC QN AUTO: 31.9 PG (ref 26–34)
MCHC RBC AUTO-ENTMCNC: 34.2 G/DL (ref 31–37)
MCV RBC AUTO: 93.4 FL
MONOCYTES # BLD AUTO: 0.52 X10(3) UL (ref 0.1–1)
MONOCYTES NFR BLD AUTO: 8.1 %
NEUTROPHILS # BLD AUTO: 3.7 X10 (3) UL (ref 1.5–7.7)
NEUTROPHILS # BLD AUTO: 3.7 X10(3) UL (ref 1.5–7.7)
NEUTROPHILS NFR BLD AUTO: 57.9 %
NITRITE UR QL STRIP.AUTO: NEGATIVE
NONHDLC SERPL-MCNC: 64 MG/DL (ref ?–130)
OSMOLALITY SERPL CALC.SUM OF ELEC: 290 MOSM/KG (ref 275–295)
PH UR: 6 [PH] (ref 5–8)
PLATELET # BLD AUTO: 213 10(3)UL (ref 150–450)
POTASSIUM SERPL-SCNC: 4.4 MMOL/L (ref 3.5–5.1)
PROT SERPL-MCNC: 7.3 G/DL (ref 6.4–8.2)
PROT UR-MCNC: NEGATIVE MG/DL
RBC # BLD AUTO: 5.01 X10(6)UL
SODIUM SERPL-SCNC: 139 MMOL/L (ref 136–145)
SP GR UR STRIP: 1.01 (ref 1–1.03)
TRIGL SERPL-MCNC: 74 MG/DL (ref 30–149)
TSI SER-ACNC: 1.11 MIU/ML (ref 0.36–3.74)
UROBILINOGEN UR STRIP-ACNC: 0.2
VLDLC SERPL CALC-MCNC: 11 MG/DL (ref 0–30)
WBC # BLD AUTO: 6.4 X10(3) UL (ref 4–11)

## 2022-10-10 PROCEDURE — 80061 LIPID PANEL: CPT

## 2022-10-10 PROCEDURE — 36415 COLL VENOUS BLD VENIPUNCTURE: CPT

## 2022-10-10 PROCEDURE — 85025 COMPLETE CBC W/AUTO DIFF WBC: CPT

## 2022-10-10 PROCEDURE — 84443 ASSAY THYROID STIM HORMONE: CPT

## 2022-10-10 PROCEDURE — 80053 COMPREHEN METABOLIC PANEL: CPT

## 2022-10-10 PROCEDURE — 81003 URINALYSIS AUTO W/O SCOPE: CPT

## 2022-10-15 DIAGNOSIS — R97.20 INCREASED PROSTATE SPECIFIC ANTIGEN (PSA) VELOCITY: Primary | ICD-10-CM

## 2022-10-18 ENCOUNTER — LAB ENCOUNTER (OUTPATIENT)
Dept: LAB | Age: 62
End: 2022-10-18
Attending: FAMILY MEDICINE
Payer: COMMERCIAL

## 2022-10-18 DIAGNOSIS — R97.20 INCREASED PROSTATE SPECIFIC ANTIGEN (PSA) VELOCITY: ICD-10-CM

## 2022-10-18 LAB — PSA SERPL-MCNC: 2.19 NG/ML (ref ?–4)

## 2022-10-18 PROCEDURE — 84153 ASSAY OF PSA TOTAL: CPT

## 2022-10-18 PROCEDURE — 36415 COLL VENOUS BLD VENIPUNCTURE: CPT

## 2022-11-16 ENCOUNTER — PATIENT MESSAGE (OUTPATIENT)
Dept: FAMILY MEDICINE CLINIC | Facility: CLINIC | Age: 62
End: 2022-11-16

## 2022-11-17 NOTE — TELEPHONE ENCOUNTER
From: Krystyna Michelle  To: Odalys Kiersten, DO  Sent: 11/16/2022  8:51 AM CST  Subject: yearly physical 11-    Yesterday 11-15-22 I had minor cold symptoms, we were at a wedding on the weekend, I took an at home Covid Test, and it cam back positive. Today, still minor cold symptoms. Can I still keep my appointment with Dr Jun Dowd on Monday 11-21-22 ? Annual physical scheduled on day #6 from start of Covid symptoms. Would you like this patient to be directed to reschedule?

## 2022-12-11 DIAGNOSIS — I10 ESSENTIAL HYPERTENSION: ICD-10-CM

## 2022-12-12 RX ORDER — AMLODIPINE BESYLATE 2.5 MG/1
TABLET ORAL
Qty: 90 TABLET | Refills: 1 | Status: SHIPPED | OUTPATIENT
Start: 2022-12-12

## 2022-12-19 ENCOUNTER — OFFICE VISIT (OUTPATIENT)
Dept: FAMILY MEDICINE CLINIC | Facility: CLINIC | Age: 62
End: 2022-12-19
Payer: COMMERCIAL

## 2022-12-19 VITALS
SYSTOLIC BLOOD PRESSURE: 140 MMHG | TEMPERATURE: 98 F | WEIGHT: 245 LBS | HEIGHT: 71 IN | HEART RATE: 76 BPM | DIASTOLIC BLOOD PRESSURE: 80 MMHG | BODY MASS INDEX: 34.3 KG/M2

## 2022-12-19 DIAGNOSIS — I10 ESSENTIAL HYPERTENSION: ICD-10-CM

## 2022-12-19 DIAGNOSIS — Z23 NEED FOR TDAP VACCINATION: Primary | ICD-10-CM

## 2022-12-19 DIAGNOSIS — Z00.00 ANNUAL PHYSICAL EXAM: ICD-10-CM

## 2022-12-19 PROCEDURE — 3077F SYST BP >= 140 MM HG: CPT | Performed by: FAMILY MEDICINE

## 2022-12-19 PROCEDURE — 99396 PREV VISIT EST AGE 40-64: CPT | Performed by: FAMILY MEDICINE

## 2022-12-19 PROCEDURE — 90471 IMMUNIZATION ADMIN: CPT | Performed by: FAMILY MEDICINE

## 2022-12-19 PROCEDURE — 3008F BODY MASS INDEX DOCD: CPT | Performed by: FAMILY MEDICINE

## 2022-12-19 PROCEDURE — 90715 TDAP VACCINE 7 YRS/> IM: CPT | Performed by: FAMILY MEDICINE

## 2022-12-19 PROCEDURE — 3079F DIAST BP 80-89 MM HG: CPT | Performed by: FAMILY MEDICINE

## 2022-12-19 NOTE — PATIENT INSTRUCTIONS
All adult screening ordered and done appropriate for patient's age and gender and risk factors and complaints. Monitor blood pressures and record at home. Limit salt intake. Encouraged physical fitness and daily physical activity daily. Recommend weight loss via daily exercising and consistent healthy dietary changes.

## 2023-03-13 DIAGNOSIS — I10 ESSENTIAL HYPERTENSION: ICD-10-CM

## 2023-03-14 RX ORDER — AMLODIPINE BESYLATE 2.5 MG/1
2.5 TABLET ORAL DAILY
Qty: 90 TABLET | Refills: 3 | Status: SHIPPED | OUTPATIENT
Start: 2023-03-14

## 2023-03-14 NOTE — TELEPHONE ENCOUNTER
Please review; protocol failed/no protocol. Requested Prescriptions   Pending Prescriptions Disp Refills    amLODIPine 2.5 MG Oral Tab 90 tablet 1     Sig: Take 1 tablet (2.5 mg total) by mouth daily.        Hypertensive Medications Protocol Failed - 3/13/2023  8:37 AM        Failed - Last BP reading less than 140/90     BP Readings from Last 1 Encounters:  12/19/22 : 140/80              Passed - In person appointment in the past 12 or next 3 months     Recent Outpatient Visits              2 months ago Need for Tdap vaccination    [address] [place] [address]    Office Visit    1 year ago Routine physical examination    [address] [place] [address]    Office Visit    1 year ago PVC (premature ventricular contraction)    [facility], [address], [place], Angelika Regalado MD    Office Visit    2 years ago Physical exam, annual    [address] [place] [address], [place]    Nurse Only    2 years ago Encounter for preventive care    [address] [place] [address],     Office Visit                      Passed - CMP or BMP in past 6 months     Recent Results (from the past 4392 hour(s))   COMP METABOLIC PANEL (14)    Collection Time: 10/10/22  8:09 AM   Result Value Ref Range    Glucose 101 (H) 70 - 99 mg/dL    Sodium 139 136 - 145 mmol/L    Potassium 4.4 3.5 - 5.1 mmol/L    Chloride 107 98 - 112 mmol/L    CO2 28.0 21.0 - 32.0 mmol/L    Anion Gap 4 0 - 18 mmol/L    BUN 18 7 - 18 mg/dL    Creatinine 0.93 0.70 - 1.30 mg/dL    BUN/CREA Ratio 19.4 10.0 - 20.0    Calcium, Total 9.0 8.5 - 10.1 mg/dL    Calculated Osmolality 290 275 - 295 mOsm/kg    eGFR-Cr 92 >=60 mL/min/1.73m2    ALT 38 16 - 61 U/L    AST 24 15 - 37 U/L    Alkaline Phosphatase 60 45 - 117 U/L    Bilirubin, Total 0.9 0.1 - 2.0 mg/dL    Total Protein 7.3 6.4 - 8.2 g/dL    Albumin 4.1 3.4 - 5.0 g/dL    Globulin  3.2 2.8 - 4.4 g/dL    A/G Ratio 1.3 1.0 - 2.0    Patient Fasting for CMP? Yes      *Note: Due to a large number of results and/or encounters for the requested time period, some results have not been displayed. A complete set of results can be found in Results Review.                Passed - In person appointment or virtual visit in the past 6 months     Recent Outpatient Visits              2 months ago Need for Tdap vaccination    [facility] [facility]    Office Visit    1 year ago Routine physical examination    [address] [facility]    Office Visit    1 year ago [facility]premature ventricular contraction)    [facility], [address], [facility] Floyd Pineda MD    Office Visit    2 years ago Physical exam, annual    [address] [facility], [facility]    Nurse Only    2 years ago Encounter for preventive care    [facility] [facility] [place],     Office Visit                      Passed - EGFRCR or GFRNAA > 50     GFR Evaluation  EGFRCR: 92 , resulted on 10/10/2022                Recent Outpatient Visits              2 months ago Need for Tdap vaccination    [address] [facility] [place],     Office Visit    1 year ago Routine physical examination    [facility] [place] [place]    Office Visit    1 year ago PVC (premature ventricular contraction)    [facility], [address], [facility] Floyd Pineda MD    Office Visit    2 years ago Physical exam, annual    [address], [facility]    Nurse Only    2 years ago Encounter for preventive care    [address], [facility] [place]    Office Visit

## 2023-08-30 ENCOUNTER — NURSE ONLY (OUTPATIENT)
Dept: FAMILY MEDICINE CLINIC | Facility: CLINIC | Age: 63
End: 2023-08-30
Payer: MEDICARE

## 2023-08-30 DIAGNOSIS — Z23 NEED FOR VACCINATION: Primary | ICD-10-CM

## 2023-08-30 PROCEDURE — G0009 ADMIN PNEUMOCOCCAL VACCINE: HCPCS | Performed by: FAMILY MEDICINE

## 2023-08-30 PROCEDURE — 90677 PCV20 VACCINE IM: CPT | Performed by: FAMILY MEDICINE

## 2023-10-17 ENCOUNTER — LAB ENCOUNTER (OUTPATIENT)
Dept: LAB | Age: 63
End: 2023-10-17
Attending: FAMILY MEDICINE
Payer: MEDICARE

## 2023-10-17 DIAGNOSIS — Z12.5 ENCOUNTER FOR PROSTATE CANCER SCREENING: ICD-10-CM

## 2023-10-17 DIAGNOSIS — Z00.00 ENCOUNTER FOR SCREENING AND PREVENTATIVE CARE: ICD-10-CM

## 2023-10-17 LAB
ALBUMIN SERPL-MCNC: 3.8 G/DL (ref 3.4–5)
ALBUMIN/GLOB SERPL: 1.1 {RATIO} (ref 1–2)
ALP LIVER SERPL-CCNC: 58 U/L
ALT SERPL-CCNC: 35 U/L
ANION GAP SERPL CALC-SCNC: 5 MMOL/L (ref 0–18)
AST SERPL-CCNC: 20 U/L (ref 15–37)
BASOPHILS # BLD AUTO: 0.04 X10(3) UL (ref 0–0.2)
BASOPHILS NFR BLD AUTO: 0.7 %
BILIRUB SERPL-MCNC: 0.8 MG/DL (ref 0.1–2)
BILIRUB UR QL: NEGATIVE
BUN BLD-MCNC: 18 MG/DL (ref 7–18)
BUN/CREAT SERPL: 17.5 (ref 10–20)
CALCIUM BLD-MCNC: 9.2 MG/DL (ref 8.5–10.1)
CHLORIDE SERPL-SCNC: 105 MMOL/L (ref 98–112)
CHOLEST SERPL-MCNC: 81 MG/DL (ref ?–200)
CLARITY UR: CLEAR
CO2 SERPL-SCNC: 29 MMOL/L (ref 21–32)
COLOR UR: COLORLESS
COMPLEXED PSA SERPL-MCNC: 3.17 NG/ML (ref ?–4)
CREAT BLD-MCNC: 1.03 MG/DL
DEPRECATED RDW RBC AUTO: 43.1 FL (ref 35.1–46.3)
EGFRCR SERPLBLD CKD-EPI 2021: 81 ML/MIN/1.73M2 (ref 60–?)
EOSINOPHIL # BLD AUTO: 0.15 X10(3) UL (ref 0–0.7)
EOSINOPHIL NFR BLD AUTO: 2.7 %
ERYTHROCYTE [DISTWIDTH] IN BLOOD BY AUTOMATED COUNT: 12.7 % (ref 11–15)
FASTING PATIENT LIPID ANSWER: YES
FASTING STATUS PATIENT QL REPORTED: YES
GLOBULIN PLAS-MCNC: 3.6 G/DL (ref 2.8–4.4)
GLUCOSE BLD-MCNC: 105 MG/DL (ref 70–99)
GLUCOSE UR-MCNC: NORMAL MG/DL
HCT VFR BLD AUTO: 46.2 %
HDLC SERPL-MCNC: 43 MG/DL (ref 40–59)
HGB BLD-MCNC: 15.7 G/DL
HGB UR QL STRIP.AUTO: NEGATIVE
IMM GRANULOCYTES # BLD AUTO: 0.01 X10(3) UL (ref 0–1)
IMM GRANULOCYTES NFR BLD: 0.2 %
KETONES UR-MCNC: NEGATIVE MG/DL
LDLC SERPL CALC-MCNC: 24 MG/DL (ref ?–100)
LEUKOCYTE ESTERASE UR QL STRIP.AUTO: NEGATIVE
LYMPHOCYTES # BLD AUTO: 1.52 X10(3) UL (ref 1–4)
LYMPHOCYTES NFR BLD AUTO: 27.8 %
MCH RBC QN AUTO: 31.2 PG (ref 26–34)
MCHC RBC AUTO-ENTMCNC: 34 G/DL (ref 31–37)
MCV RBC AUTO: 91.7 FL
MONOCYTES # BLD AUTO: 0.46 X10(3) UL (ref 0.1–1)
MONOCYTES NFR BLD AUTO: 8.4 %
NEUTROPHILS # BLD AUTO: 3.28 X10 (3) UL (ref 1.5–7.7)
NEUTROPHILS # BLD AUTO: 3.28 X10(3) UL (ref 1.5–7.7)
NEUTROPHILS NFR BLD AUTO: 60.2 %
NITRITE UR QL STRIP.AUTO: NEGATIVE
NONHDLC SERPL-MCNC: 38 MG/DL (ref ?–130)
OSMOLALITY SERPL CALC.SUM OF ELEC: 290 MOSM/KG (ref 275–295)
PH UR: 6.5 [PH] (ref 5–8)
PLATELET # BLD AUTO: 190 10(3)UL (ref 150–450)
POTASSIUM SERPL-SCNC: 4.3 MMOL/L (ref 3.5–5.1)
PROT SERPL-MCNC: 7.4 G/DL (ref 6.4–8.2)
PROT UR-MCNC: NEGATIVE MG/DL
RBC # BLD AUTO: 5.04 X10(6)UL
SODIUM SERPL-SCNC: 139 MMOL/L (ref 136–145)
SP GR UR STRIP: <1.005 (ref 1–1.03)
TRIGL SERPL-MCNC: 62 MG/DL (ref 30–149)
TSI SER-ACNC: 1.31 MIU/ML (ref 0.36–3.74)
UROBILINOGEN UR STRIP-ACNC: NORMAL
VLDLC SERPL CALC-MCNC: 8 MG/DL (ref 0–30)
WBC # BLD AUTO: 5.5 X10(3) UL (ref 4–11)

## 2023-10-17 PROCEDURE — 85025 COMPLETE CBC W/AUTO DIFF WBC: CPT

## 2023-10-17 PROCEDURE — 80061 LIPID PANEL: CPT

## 2023-10-17 PROCEDURE — 36415 COLL VENOUS BLD VENIPUNCTURE: CPT

## 2023-10-17 PROCEDURE — 80053 COMPREHEN METABOLIC PANEL: CPT

## 2023-10-17 PROCEDURE — 81003 URINALYSIS AUTO W/O SCOPE: CPT

## 2023-10-17 PROCEDURE — 84443 ASSAY THYROID STIM HORMONE: CPT

## 2023-12-21 ENCOUNTER — OFFICE VISIT (OUTPATIENT)
Dept: FAMILY MEDICINE CLINIC | Facility: CLINIC | Age: 63
End: 2023-12-21
Payer: MEDICARE

## 2023-12-21 VITALS
SYSTOLIC BLOOD PRESSURE: 136 MMHG | HEART RATE: 61 BPM | HEIGHT: 71 IN | DIASTOLIC BLOOD PRESSURE: 82 MMHG | BODY MASS INDEX: 33.46 KG/M2 | TEMPERATURE: 98 F | WEIGHT: 239 LBS

## 2023-12-21 DIAGNOSIS — Z00.00 MEDICARE ANNUAL WELLNESS VISIT, INITIAL: Primary | ICD-10-CM

## 2023-12-21 DIAGNOSIS — I10 ESSENTIAL HYPERTENSION: ICD-10-CM

## 2023-12-21 DIAGNOSIS — Z82.49 FAMILY HISTORY OF PREMATURE CAD: ICD-10-CM

## 2023-12-21 DIAGNOSIS — E78.5 HYPERLIPIDEMIA, UNSPECIFIED HYPERLIPIDEMIA TYPE: ICD-10-CM

## 2023-12-21 PROCEDURE — G0402 INITIAL PREVENTIVE EXAM: HCPCS | Performed by: FAMILY MEDICINE

## 2023-12-21 NOTE — PATIENT INSTRUCTIONS
All adult screening ordered and done appropriate for patient's age and gender and risk factors and complaints. Medication reviewed and renewed where needed and appropriate. Comply with medications. Monitor blood pressures and record at home. Limit salt intake. CT calcium scoring ordered.

## 2023-12-21 NOTE — PROGRESS NOTES
Subjective:     Patient ID: Gerald Navarro is a 70year old male. This patient is a 15-year-old hypertensive  gentleman with a strong family history of coronary artery disease here for complete preventive care physical and for status update on any confirmed chronic medical illnesses and follow up on any previous labs or procedures that were suggestive or in need of further work up. Colonoscopy is current. Bowel, bladder, and sexual functions are intact. Patient denies headaches, chest pain, dizziness, shortness of breath, visual changes, and/or exertional fatigue. History/Other:   Review of Systems  Current Outpatient Medications   Medication Sig Dispense Refill    amLODIPine 2.5 MG Oral Tab Take 1 tablet (2.5 mg total) by mouth daily. 90 tablet 3    Turmeric 400 MG Oral Cap Take by mouth. Apple Cider Vinegar 188 MG Oral Cap Take by mouth. Cholecalciferol (VITAMIN D3) 25 MCG (1000 UT) Oral Cap Take 1 tablet by mouth daily. Rosuvastatin Calcium 10 MG Oral Tab Take 0.5 tablets (5 mg total) by mouth daily. Patient is taking 5mg.        Allergies:No Known Allergies    Past Medical History:   Diagnosis Date    Essential hypertension     High blood pressure     Lipid screening 02-    per [employer]    Tubular adenoma of colon 2021    repeat CLn in 5 years      Past Surgical History:   Procedure Laterality Date    COLONOSCOPY      COLONOSCOPY      COLONOSCOPY N/A 2/26/2018    Procedure: COLONOSCOPY;  Surgeon: Bernarda Kwong MD;  Location: [address] [place] [address] ENDOSCOPY      Family History   Problem Relation Age of Onset    Cancer Sister 64        colon cancer    Hypertension Father     Heart Attack Father         Myocardial infarction    Melanoma Mother     Cancer Mother         skin cancer    Prostate Cancer Brother     Cancer Brother 47        prostate cancer    Diabetes Brother       Social History:   Social History     Socioeconomic History    Marital status:    Tobacco Use Smoking status: Former    Smokeless tobacco: Never    Tobacco comments:     quit 40 years ago   Vaping Use    Vaping Use: Never used   Substance and Sexual Activity    Alcohol use: Yes     Alcohol/week: 0.0 standard drinks of alcohol     Comment: 1 drink daily    Drug use: No   Other Topics Concern    Caffeine Concern Yes     Comment: coffee        [address]   Tests on this list are recommended by your physician but may not be covered, or covered at this frequency, by your insurer. Please check with your insurance carrier before scheduling to verify coverage. PREVENTATIVE SERVICES  INDICATIONS AND SCHEDULE Internal Lab or Procedure External Lab or Procedure   Diabetes Screening      HbgA1C   Annually No results found for: \"A1C\"      No data to display                Fasting Blood Sugar (FSB) Annually Glucose (mg/dL)   Date Value   10/17/2023 105 (H)       Cardiovascular Disease Screening     LDL Annually LDL Cholesterol (mg/dL)   Date Value   10/17/2023 24        EKG One Time      Colorectal Cancer Screening      Colonoscopy Screen every 10 years Health Maintenance   Topic Date Due    Colorectal Cancer Screening  05/21/2026    Update Health Maintenance if applicable    Flex Sigmoidoscopy Screen every 5 years No results found for this or any previous visit. No data to display                 Fecal Occult Blood Annually No results found for: \"FOB\", \"OCCULTSTOOL\"      No data to display                Glaucoma Screening      Ophthalmology Visit Annually      Prostate Cancer Screening      PSA  Annually Health Maintenance   Topic Date Due    PSA  10/17/2025     Update Health Maintenance if applicable   Immunizations      Influenza No orders found for this or any previous visit. Update Immunization Activity if applicable    Pneumococcal No orders found for this or any previous visit.  Update Immunization Activity if applicable    Hepatitis B No orders found for this or any previous visit. Update Immunization Activity if applicable    Tetanus Orders placed or performed in visit on 12/19/22    TETANUS, DIPHTHERIA TOXOIDS AND ACELLULAR PERTUSIS VACCINE (TDAP), >7 YEARS, IM USE    Update Immunization Activity if applicable    Zoster (Not covered by Medicare Part B) No orders found for this or any previous visit. Update Immunization Activity if applicable     SPECIFIC DISEASE MONITORING Internal Lab or Procedure External Lab or Procedure   Annual Monitoring of Persistent     Medications (ACE/ARB, digoxin, diuretics)    Potassium  Annually Potassium (mmol/L)   Date Value   10/17/2023 4.3     POTASSIUM (P) (mmol/L)   Date Value   10/02/2015 4.2         No data to display                Creatinine  Annually Creatinine (mg/dL)   Date Value   10/17/2023 1.03         No data to display                Digoxin Serum Conc  Annually No results found for: \"DIGOXIN\"      No data to display                Diabetes      HgbA1C  Annually No results found for: \"A1C\"      No data to display                Creat/alb ratio  Annually      LDL  Annually LDL Cholesterol (mg/dL)   Date Value   10/17/2023 24         No data to display                 Dilated Eye exam  Annually      No data to display                   No data to display                COPD      Spirometry Testing Annually No results found for this or any previous visit.       No data to display                    General Health     In the past six months, have you lost more than 10 pounds without trying?: 2 - No    Has your appetite been poor?: No    Type of Diet: Balanced    How does the patient maintain a good energy level?: Daily Walks    How would you describe your daily physical activity?: Light    How would you describe your current health state?: Good    How do you maintain positive mental well-being?: Social Interaction         Have you had any immunizations at another office such as Influenza, Hepatitis B, Tetanus, or Pneumococcal?: No Functional Ability     Bathing or Showering: Able without help    Toileting: Able without help    Dressing: Able without help    Eating: Able without help    Driving: Able without help    Preparing your meals: Able without help    Managing money/bills: Able without help    Taking medications as prescribed: Able without help    Are you able to afford your medications?: No    Hearing Problems?: No     Functional Status     Hearing Problems?: No    Vision Problems? : No    Difficulty walking?: No    Difficulty dressing or bathing?: No    Problems with daily activities? : No    Memory Problems?: No      Fall/Risk Assessment                                                              Depression Screening (PHQ-2/PHQ-9): Over the LAST 2 WEEKS                      Advance Directives     Do you have a healthcare power of ?: No    Do you have a living will?: No     Hearing Assessment (Required for AWV/SWV)      Hearing Screening    Time taken: 12/21/2023  9:40 AM  Entry User: Brenda Banda MA  Screening Method: Questionnaire  I have a problem hearing over the telephone: No I have trouble following the conversations when two or more people are talking at the same time: No   I have trouble understanding things on the TV: No I have to strain to understand conversations: No   I have to worry about missing the telephone ring or doorbell: No I have trouble hearing conversations in a noisy background such as a crowded room or restaurant: No   I get confused about where sounds come from: No I misunderstand some words in a sentence and need to ask people to repeat themselves: No   I especially have trouble understanding the speech of women and children: No I have trouble understanding the speaker in a large room such as at a meeting or place of [employer]: No   Many people I talk to seem to mumble (or don't speak clearly): No People get annoyed because I misunderstand what they say: No   I misunderstand what others are saying and make inappropriate responses: No I avoid social activities because I cannot hear well and fear I will reply improperly: No   Family members and friends have told me they think I may have hearing loss: No             Visual Acuity     Right Eye Visual Acuity: Uncorrected Left Eye Visual Acuity: Uncorrected   Right Eye Chart Acuity: 20/40 Left Eye Chart Acuity: 20/40     Cognitive Assessment     What day of the week is this?: Correct    What month is it?: Correct    What year is it?: Correct    Recall \"Ball\": Correct    Recall \"Flag\": Correct    Recall \"Tree\": Correct          Objective:   Vitals:    12/21/23 0944   BP: 136/82   Pulse:    Temp:        Physical Exam  Constitutional:       General: He is not in acute distress. Appearance: Normal appearance. He is not ill-appearing. HENT:      Head: Normocephalic and atraumatic. Right Ear: Tympanic membrane normal.      Left Ear: Tympanic membrane normal.      Nose: Nose normal.      Mouth/Throat:      Mouth: Mucous membranes are moist.   Neck:      Thyroid: No thyromegaly. Cardiovascular:      Rate and Rhythm: Normal rate and regular rhythm. Heart sounds:      No gallop. Pulmonary:      Effort: Pulmonary effort is normal.      Breath sounds: Normal breath sounds. Abdominal:      General: Bowel sounds are normal.      Palpations: Abdomen is soft. There is no mass. Neurological:      Mental Status: He is alert and oriented to person, place, and time. Psychiatric:         Mood and Affect: Mood normal.         Assessment & Plan:   1. Medicare annual wellness visit, initial  Survey completed. Labs reviewed that were drawn prior to the appointment. 2. Essential hypertension  Blood pressure measures to goal.  Compliance with medication emphasized and encouraged. 3. Hyperlipidemia, unspecified hyperlipidemia type  Lipid profile reveals normal values.   Patient encouraged to continue with consistent healthy dietary choices as well as 150 minutes of safe exercise per week. 4. Family history of premature CAD  And lieu of subtle EKG changes in the past and also stress echocardiograms done by the patient's cardiology and also first-degree family history for coronary artery disease in his father who  from an MI at 71years old, CT calcium scoring recommended and the patient receptive to this test.  - CT CALCIUM SCORING; Future      No orders of the defined types were placed in this encounter. Meds This Visit:  Requested Prescriptions      No prescriptions requested or ordered in this encounter       Imaging & Referrals:  CT CALCIUM SCORING     Patient Instructions   All adult screening ordered and done appropriate for patient's age and gender and risk factors and complaints. Medication reviewed and renewed where needed and appropriate. Comply with medications. Monitor blood pressures and record at home. Limit salt intake. CT calcium scoring ordered. Return in about 1 year (around 2024), or if symptoms worsen or fail to improve.

## 2024-03-02 DIAGNOSIS — I10 ESSENTIAL HYPERTENSION: ICD-10-CM

## 2024-03-05 RX ORDER — AMLODIPINE BESYLATE 2.5 MG/1
2.5 TABLET ORAL DAILY
Qty: 90 TABLET | Refills: 3 | Status: SHIPPED | OUTPATIENT
Start: 2024-03-05

## 2024-03-05 NOTE — TELEPHONE ENCOUNTER
Refill passed per [facility] protocol.    Requested Prescriptions   Pending Prescriptions Disp Refills    AMLODIPINE 2.5 MG Oral Tab [Pharmacy Med Name: AMLODIPINE BESYLATE 2.5 MG TAB] 90 tablet 3     Sig: TAKE 1 TABLET BY MOUTH EVERY DAY       Hypertension Medications Protocol Passed - 3/2/2024  9:26 AM        Passed - CMP or BMP in past 12 months        Passed - Last BP reading less than 140/90     BP Readings from Last 1 Encounters:   12/21/23 136/82               Passed - In person appointment or virtual visit in the past 12 mos or appointment in next 3 mos     Recent Outpatient Visits              2 months ago Medicare annual wellness visit, initial    [facility] Giancarlo Cummins, DO    Office Visit    6 months ago Need for vaccination    [facility]    Nurse Only    1 year ago Need for Tdap vaccination    [facility] Giancarlo Cummins, DO    Office Visit    2 years ago Routine physical examination    [facility] Giancarlo Cummins, DO    Office Visit    2 years ago PVC (premature ventricular contraction)    [facility] WILIAM Bauer MD    Office Visit                      Passed - EGFRCR or GFRNAA > 50     GFR Evaluation  EGFRCR: 81 , resulted on 10/17/2023               Recent Outpatient Visits              2 months ago Medicare annual wellness visit, initial    [facility] Giancarlo Cummins, DO    Office Visit    6 months ago Need for vaccination    [facility]    Nurse Only    1 year ago Need for Tdap vaccination    [facility] Giancarlo Cummins, DO    Office Visit    2 years ago Routine physical examination    [facility]  Giancarlo Cummins,     Office Visit    2 years ago PVC (premature ventricular contraction)    [facility] WILIAM Bauer MD    Office Visit

## 2024-04-09 ENCOUNTER — HOSPITAL ENCOUNTER (OUTPATIENT)
Dept: CT IMAGING | Age: 64
Discharge: HOME | End: 2024-04-09
Attending: FAMILY MEDICINE

## 2024-04-09 DIAGNOSIS — Z82.49 FAMILY HISTORY OF PREMATURE CAD: ICD-10-CM

## 2024-04-15 ENCOUNTER — PATIENT MESSAGE (OUTPATIENT)
Dept: FAMILY MEDICINE CLINIC | Facility: CLINIC | Age: 64
End: 2024-04-15

## 2024-04-15 DIAGNOSIS — R91.1 PULMONARY NODULE: Primary | ICD-10-CM

## 2024-04-15 NOTE — TELEPHONE ENCOUNTER
From: Eddie Washington  To: Giancarlo Cummins  Sent: 4/15/2024 7:34 AM CDT  Subject: CT Calcium    Dr Cummins,   I see a cardiologist yearly, Dr Mark Santo with [facility], I will follow up with him on this, and keep you updated  You mentioned a new CT scan order, focusing on the lungs, would be in my chart , I have not seen this yet  Thanks,   Sukh Washington

## 2024-04-15 NOTE — TELEPHONE ENCOUNTER
Dr Cummins, please specify what CT order is needed for this patient.  No order found in chart.           The following report is an extended read from your heart examination.  There was at least 2 pulmonary nodules, 1 in which has a small size and benign appearance and the other 1 is just on the edge of the field and cannot have a confident description.  I am going to recommend that we get a chest CT dedicated to these nodules.  I believe this is a benign process, however we want to get a baseline picture in order to be able to follow this as deemed necessary and appropriate.  The order is on the chart.  If you have any questions, please feel free to call the clinic.   Written by Giancarlo Cummins DO on 4/11/2024  1:10 PM CDT  Seen by patient Eddie KENNY Padmini on 4/11/2024  1:11 PM

## 2024-04-17 NOTE — TELEPHONE ENCOUNTER
CT of the chest high-resolution has been placed on the chart.  Please call the patient and let him know.  Thank you.

## 2024-04-18 NOTE — TELEPHONE ENCOUNTER
From  Camille Landers RN To  Eddie Washington Sent and Delivered  4/18/2024  8:05 AM   Last Read in MyChart  4/18/2024  8:11 AM by Eddie Washington

## 2024-07-23 ENCOUNTER — HOSPITAL ENCOUNTER (OUTPATIENT)
Dept: CT IMAGING | Age: 64
Discharge: HOME | End: 2024-07-23
Attending: FAMILY MEDICINE
Payer: MEDICARE

## 2024-07-23 DIAGNOSIS — R91.1 PULMONARY NODULE: ICD-10-CM

## 2024-07-23 PROCEDURE — 71250 CT THORAX DX C-: CPT | Performed by: FAMILY MEDICINE

## 2024-10-07 ENCOUNTER — PATIENT MESSAGE (OUTPATIENT)
Dept: FAMILY MEDICINE CLINIC | Facility: CLINIC | Age: 64
End: 2024-10-07

## 2024-10-07 DIAGNOSIS — Z12.5 ENCOUNTER FOR SCREENING PROSTATE SPECIFIC ANTIGEN (PSA) MEASUREMENT: ICD-10-CM

## 2024-10-07 DIAGNOSIS — Z00.00 ENCOUNTER FOR SCREENING AND PREVENTATIVE CARE: Primary | ICD-10-CM

## 2024-10-08 NOTE — TELEPHONE ENCOUNTER
From: Eddie Washington  To: Giancarlo Cummins  Sent: 10/7/2024 12:29 PM CDT  Subject: bloodwork    Dr Cummins,   I have my yearly physical set with you in January. I am visiting my Cardiologist, Dr Santo, for my yearly visit with him on 10-29.  Can you please put an order in for full bloodwork so I have the results available for Dr Santo to view prior to my 10-29 appointment.  Thanks  Sukh Washington

## 2024-10-14 NOTE — TELEPHONE ENCOUNTER
The labs have been reviewed and signed on the chart.  The patient can come in and get them done prior to his appointment.  Please call him and let him know.  Thank you.

## 2024-10-17 ENCOUNTER — LAB ENCOUNTER (OUTPATIENT)
Dept: LAB | Age: 64
End: 2024-10-17
Attending: FAMILY MEDICINE
Payer: MEDICARE

## 2024-10-17 DIAGNOSIS — Z00.00 MEDICARE ANNUAL WELLNESS VISIT, SUBSEQUENT: ICD-10-CM

## 2024-10-17 DIAGNOSIS — Z00.00 MEDICARE ANNUAL WELLNESS VISIT, SUBSEQUENT: Primary | ICD-10-CM

## 2024-10-17 DIAGNOSIS — Z12.5 ENCOUNTER FOR SCREENING PROSTATE SPECIFIC ANTIGEN (PSA) MEASUREMENT: ICD-10-CM

## 2024-10-17 DIAGNOSIS — Z00.00 ENCOUNTER FOR SCREENING AND PREVENTATIVE CARE: ICD-10-CM

## 2024-10-17 LAB
ALBUMIN SERPL-MCNC: 4.7 G/DL (ref 3.2–4.8)
ALBUMIN/GLOB SERPL: 2 {RATIO} (ref 1–2)
ALP LIVER SERPL-CCNC: 62 U/L
ALT SERPL-CCNC: 28 U/L
ANION GAP SERPL CALC-SCNC: 5 MMOL/L (ref 0–18)
AST SERPL-CCNC: 22 U/L (ref ?–34)
BASOPHILS # BLD AUTO: 0.03 X10(3) UL (ref 0–0.2)
BASOPHILS NFR BLD AUTO: 0.5 %
BILIRUB SERPL-MCNC: 0.9 MG/DL (ref 0.2–1.1)
BILIRUB UR QL: NEGATIVE
BUN BLD-MCNC: 19 MG/DL (ref 9–23)
BUN/CREAT SERPL: 21.1 (ref 10–20)
CALCIUM BLD-MCNC: 9.5 MG/DL (ref 8.7–10.4)
CHLORIDE SERPL-SCNC: 108 MMOL/L (ref 98–112)
CHOLEST SERPL-MCNC: 92 MG/DL (ref ?–200)
CLARITY UR: CLEAR
CO2 SERPL-SCNC: 27 MMOL/L (ref 21–32)
COLOR UR: COLORLESS
COMPLEXED PSA SERPL-MCNC: 1.15 NG/ML (ref ?–4)
CREAT BLD-MCNC: 0.9 MG/DL
DEPRECATED RDW RBC AUTO: 44.9 FL (ref 35.1–46.3)
EGFRCR SERPLBLD CKD-EPI 2021: 94 ML/MIN/1.73M2 (ref 60–?)
EOSINOPHIL # BLD AUTO: 0.11 X10(3) UL (ref 0–0.7)
EOSINOPHIL NFR BLD AUTO: 2 %
ERYTHROCYTE [DISTWIDTH] IN BLOOD BY AUTOMATED COUNT: 13.2 % (ref 11–15)
FASTING PATIENT LIPID ANSWER: YES
FASTING STATUS PATIENT QL REPORTED: YES
GLOBULIN PLAS-MCNC: 2.4 G/DL (ref 2–3.5)
GLUCOSE BLD-MCNC: 102 MG/DL (ref 70–99)
GLUCOSE UR-MCNC: NORMAL MG/DL
HCT VFR BLD AUTO: 45.4 %
HDLC SERPL-MCNC: 38 MG/DL (ref 40–59)
HGB BLD-MCNC: 15.8 G/DL
HGB UR QL STRIP.AUTO: NEGATIVE
IMM GRANULOCYTES # BLD AUTO: 0.01 X10(3) UL (ref 0–1)
IMM GRANULOCYTES NFR BLD: 0.2 %
KETONES UR-MCNC: NEGATIVE MG/DL
LDLC SERPL CALC-MCNC: 42 MG/DL (ref ?–100)
LEUKOCYTE ESTERASE UR QL STRIP.AUTO: NEGATIVE
LYMPHOCYTES # BLD AUTO: 1.61 X10(3) UL (ref 1–4)
LYMPHOCYTES NFR BLD AUTO: 29.4 %
MCH RBC QN AUTO: 32.4 PG (ref 26–34)
MCHC RBC AUTO-ENTMCNC: 34.8 G/DL (ref 31–37)
MCV RBC AUTO: 93.2 FL
MONOCYTES # BLD AUTO: 0.48 X10(3) UL (ref 0.1–1)
MONOCYTES NFR BLD AUTO: 8.8 %
NEUTROPHILS # BLD AUTO: 3.24 X10 (3) UL (ref 1.5–7.7)
NEUTROPHILS # BLD AUTO: 3.24 X10(3) UL (ref 1.5–7.7)
NEUTROPHILS NFR BLD AUTO: 59.1 %
NITRITE UR QL STRIP.AUTO: NEGATIVE
NONHDLC SERPL-MCNC: 54 MG/DL (ref ?–130)
OSMOLALITY SERPL CALC.SUM OF ELEC: 292 MOSM/KG (ref 275–295)
PH UR: 6.5 [PH] (ref 5–8)
PLATELET # BLD AUTO: 183 10(3)UL (ref 150–450)
POTASSIUM SERPL-SCNC: 4.7 MMOL/L (ref 3.5–5.1)
PROT SERPL-MCNC: 7.1 G/DL (ref 5.7–8.2)
PROT UR-MCNC: NEGATIVE MG/DL
RBC # BLD AUTO: 4.87 X10(6)UL
SODIUM SERPL-SCNC: 140 MMOL/L (ref 136–145)
SP GR UR STRIP: <1.005 (ref 1–1.03)
TRIGL SERPL-MCNC: 44 MG/DL (ref 30–149)
TSI SER-ACNC: 1.16 MIU/ML (ref 0.55–4.78)
UROBILINOGEN UR STRIP-ACNC: NORMAL
VLDLC SERPL CALC-MCNC: 6 MG/DL (ref 0–30)
WBC # BLD AUTO: 5.5 X10(3) UL (ref 4–11)

## 2024-10-17 PROCEDURE — 81003 URINALYSIS AUTO W/O SCOPE: CPT

## 2024-10-17 PROCEDURE — 80061 LIPID PANEL: CPT

## 2024-10-17 PROCEDURE — 36415 COLL VENOUS BLD VENIPUNCTURE: CPT

## 2024-10-17 PROCEDURE — 80053 COMPREHEN METABOLIC PANEL: CPT

## 2024-10-17 PROCEDURE — 85025 COMPLETE CBC W/AUTO DIFF WBC: CPT

## 2024-10-17 PROCEDURE — 84443 ASSAY THYROID STIM HORMONE: CPT

## 2025-02-18 ENCOUNTER — OFFICE VISIT (OUTPATIENT)
Dept: FAMILY MEDICINE CLINIC | Facility: CLINIC | Age: 65
End: 2025-02-18
Payer: MEDICARE

## 2025-02-18 VITALS
RESPIRATION RATE: 18 BRPM | OXYGEN SATURATION: 98 % | SYSTOLIC BLOOD PRESSURE: 130 MMHG | HEART RATE: 80 BPM | BODY MASS INDEX: 33 KG/M2 | WEIGHT: 240 LBS | TEMPERATURE: 98 F | DIASTOLIC BLOOD PRESSURE: 82 MMHG

## 2025-02-18 DIAGNOSIS — I10 ESSENTIAL HYPERTENSION: ICD-10-CM

## 2025-02-18 DIAGNOSIS — Z28.21 INFLUENZA VACCINATION DECLINED BY PATIENT: ICD-10-CM

## 2025-02-18 DIAGNOSIS — Z00.00 MEDICARE ANNUAL WELLNESS VISIT, SUBSEQUENT: Primary | ICD-10-CM

## 2025-02-18 PROCEDURE — G0439 PPPS, SUBSEQ VISIT: HCPCS | Performed by: FAMILY MEDICINE

## 2025-02-18 RX ORDER — ROSUVASTATIN CALCIUM 5 MG/1
5 TABLET, COATED ORAL DAILY
COMMUNITY
Start: 2025-01-24

## 2025-02-18 RX ORDER — AMLODIPINE BESYLATE 5 MG/1
5 TABLET ORAL DAILY
COMMUNITY
Start: 2025-01-25

## 2025-02-18 NOTE — PROGRESS NOTES
Subjective:     Patient ID: Eddie Washington is a 64 year old male.    64 year old hypertensive  male here for medicare annual visit which will satisfy all the requirements for a complete preventive care physical and for status update on any confirmed chronic medical illnesses and follow up on any previous labs or procedures that were suggestive or in need of further work up. Colonoscopy is current. Bowel and bladder functions are intact.    Declines headache, SOB, chest pain, dizziness, acute visual changes.              History/Other:   Review of Systems  Current Outpatient Medications   Medication Sig Dispense Refill    amLODIPine 5 MG Oral Tab Take 1 tablet (5 mg total) by mouth daily.      rosuvastatin 5 MG Oral Tab Take 1 tablet (5 mg total) by mouth daily.      Turmeric 400 MG Oral Cap Take by mouth.      Apple Cider Vinegar 188 MG Oral Cap Take by mouth.      Cholecalciferol (VITAMIN D3) 25 MCG (1000 UT) Oral Cap Take 1 tablet by mouth daily.      amLODIPine 2.5 MG Oral Tab Take 1 tablet (2.5 mg total) by mouth daily. (Patient not taking: Reported on 2/18/2025) 90 tablet 3    Rosuvastatin Calcium 10 MG Oral Tab Take 0.5 tablets (5 mg total) by mouth daily. Patient is taking 5mg. (Patient not taking: Reported on 2/18/2025)       Allergies:Allergies[1]    Past Medical History:    Essential hypertension    High blood pressure    Lipid screening    per [employer]    Tubular adenoma of colon    repeat CLn in 5 years      Past Surgical History:   Procedure Laterality Date    Colonoscopy      Colonoscopy      Colonoscopy N/A 2/26/2018    Procedure: COLONOSCOPY;  Surgeon: Rasheed Camarillo MD;  Location: [facility] ENDOSCOPY      Family History   Problem Relation Age of Onset    Cancer Sister 56        colon cancer    Hypertension Father     Heart Attack Father         Myocardial infarction    Melanoma Mother     Cancer Mother         skin cancer    Prostate Cancer Brother     Cancer Brother 54        prostate  cancer    Diabetes Brother       Social History:   Social History     Socioeconomic History    Marital status:    Tobacco Use    Smoking status: Former    Smokeless tobacco: Never    Tobacco comments:     quit 40 years ago   Vaping Use    Vaping status: Never Used   Substance and Sexual Activity    Alcohol use: Yes     Alcohol/week: 0.0 standard drinks of alcohol     Comment: 1 drink daily    Drug use: No   Other Topics Concern    Caffeine Concern Yes     Comment: coffee     Social Drivers of Health     Food Insecurity: No Food Insecurity (2/18/2025)    NCSS - Food Insecurity     Worried About Running Out of Food in the Last Year: No     Ran Out of Food in the Last Year: No   Transportation Needs: No Transportation Needs (2/18/2025)    NCSS - Transportation     Lack of Transportation: No   Housing Stability: Not At Risk (2/18/2025)    NCSS - Housing/Utilities     Has Housing: Yes     Worried About Losing Housing: No     Unable to Get Utilities: No        Objective:   Vitals:    02/18/25 0937   BP: 141/80   Pulse: 80   Resp: 18   Temp: 97.9 °F (36.6 °C)       Physical Exam  Constitutional:       Appearance: Normal appearance. He is not ill-appearing.   HENT:      Right Ear: Tympanic membrane normal.      Left Ear: Tympanic membrane normal.      Nose: Nose normal.      Mouth/Throat:      Mouth: Mucous membranes are moist.   Cardiovascular:      Rate and Rhythm: Normal rate and regular rhythm.      Heart sounds:      No gallop.   Pulmonary:      Effort: Pulmonary effort is normal.      Breath sounds: Normal breath sounds.   Abdominal:      General: Bowel sounds are normal.      Palpations: Abdomen is soft.   Neurological:      Mental Status: He is alert and oriented to person, place, and time.   Psychiatric:         Mood and Affect: Mood normal.         Assessment & Plan:   1. Medicare annual wellness visit, subsequent  Survey completed and labs done on 10/17/2024 reviewed.    2. Essential hypertension  To goal.  Medication compliance encouraged and minimize salt intake.    3. Influenza vaccination declined by patient  Already administered.  - High Dose Fluzone trivalent influenza, 65yrs+ PFS (20502)        Orders Placed This Encounter   Procedures    High Dose Fluzone trivalent influenza, 65yrs+ PFS (79547)       Meds This Visit:  Requested Prescriptions      No prescriptions requested or ordered in this encounter       Imaging & Referrals:  INFLUENZA VAC HIGH DOSE PRSV FREE     Patient Instructions   All adult screening ordered and done appropriate for patient's age and gender and risk factors and complaints.  Recommend weight loss via daily exercising and consistent healthy dietary changes.  Encouraged physical fitness and daily physical activity daily.  Medication reviewed and renewed where needed and appropriate.  Comply with medications.    Return in about 1 year (around 2/18/2026), or if symptoms worsen or fail to improve.         [1] No Known Allergies

## 2025-06-02 ENCOUNTER — LAB ENCOUNTER (OUTPATIENT)
Dept: LAB | Age: 65
End: 2025-06-02
Attending: FAMILY MEDICINE
Payer: MEDICARE

## 2025-06-02 DIAGNOSIS — Z01.84 IMMUNITY STATUS TESTING: ICD-10-CM

## 2025-06-02 LAB
RUBV IGG SER QL: POSITIVE
RUBV IGG SER-ACNC: 188 IU/ML (ref 10–?)

## 2025-06-02 PROCEDURE — 36415 COLL VENOUS BLD VENIPUNCTURE: CPT

## 2025-06-02 PROCEDURE — 86765 RUBEOLA ANTIBODY: CPT

## 2025-06-02 PROCEDURE — 86762 RUBELLA ANTIBODY: CPT

## 2025-06-04 LAB — MEV IGG SER-ACNC: 45.1 AU/ML (ref 16.5–?)

## 2025-06-06 DIAGNOSIS — I10 ESSENTIAL HYPERTENSION: ICD-10-CM

## 2025-06-06 RX ORDER — AMLODIPINE BESYLATE 2.5 MG/1
2.5 TABLET ORAL DAILY
Qty: 90 TABLET | Refills: 3 | Status: SHIPPED | OUTPATIENT
Start: 2025-06-06

## 2025-07-22 ENCOUNTER — PATIENT MESSAGE (OUTPATIENT)
Dept: FAMILY MEDICINE CLINIC | Facility: CLINIC | Age: 65
End: 2025-07-22

## 2025-07-22 DIAGNOSIS — R91.1 PULMONARY NODULE: Primary | ICD-10-CM

## 2025-07-22 NOTE — TELEPHONE ENCOUNTER
The CT of your chest shows stability with regards to the nodules and the measurements remain in the safer zone.  Per specific guidelines there is really no need for follow-up aside from clinical suspicion and patient's potential occupational exposures and general health risks.  I suggest that we repeat this in 1 year to be on the very extremes conservative side of making sure that nothing changes.  Thank you.   Written by Giancarlo Cummins DO on 7/24/2024  6:34 PM CDT  Seen by patient Eddie KENNY Padmini on 7/24/2024  6:37 PM

## 2025-07-25 NOTE — TELEPHONE ENCOUNTER
Let patient know that I have reviewed and signed the CT for the chest to follow-up on the nodules.  It is always good to be thorough and sure.  Please let him know.

## (undated) DIAGNOSIS — Z01.818 PREOP EXAMINATION: Primary | ICD-10-CM

## (undated) DIAGNOSIS — Z11.59 SCREENING FOR VIRAL DISEASE: ICD-10-CM

## (undated) DEVICE — SNARE OPTMZ PLPCTM TRP

## (undated) DEVICE — FORCEP RADIAL JAW 4

## (undated) DEVICE — Device

## (undated) DEVICE — Device: Brand: DEFENDO AIR/WATER/SUCTION AND BIOPSY VALVE

## (undated) DEVICE — CLIP RESOLUTION 235CM

## (undated) DEVICE — REM POLYHESIVE ADULT PATIENT RETURN ELECTRODE: Brand: VALLEYLAB

## (undated) DEVICE — SNARE CAPTIFLEX MICRO-OVL OLY

## (undated) DEVICE — ENDOSCOPY PACK - LOWER: Brand: MEDLINE INDUSTRIES, INC.

## (undated) NOTE — MR AVS SNAPSHOT
[facility] - [place] [address] [address]  [id number] [phone]               Thank you for choosing us for your health care visit with Carlito Burks MD.  We are glad to serve you and happy to provide you with this summary o [address] [facility]    [address] [address]   [phone]              Allergies as of Feb 15, 2017     No Known Allergies                Today's Vital Signs     BP Pulse Temp H dairy products with reduced content of saturated and total fat.    Dietary sodium reduction Reduce dietary sodium intake to <= 100 mmol per day (2.4 g sodium or 6 g sodium chloride)   Aerobic physical activity Regular aerobic physical activity (e.g., brisk

## (undated) NOTE — MR AVS SNAPSHOT
[facility] - [place] [address] [address] [address] [phone]               Thank you for choosing us for your health care visit with Michelle Jones MD.  We are glad to serve you and happy to provide you with this summary o

## (undated) NOTE — LETTER
2/27/2018              [address]ax [address]        [phone] [phone]         Dear Starla Cast,    I wanted to get back to you with your colonoscopy results. You had 5 colon polyps removed which were benign.   I would advise a repeat